# Patient Record
Sex: MALE | URBAN - METROPOLITAN AREA
[De-identification: names, ages, dates, MRNs, and addresses within clinical notes are randomized per-mention and may not be internally consistent; named-entity substitution may affect disease eponyms.]

---

## 2017-07-19 ENCOUNTER — TRANSFERRED RECORDS (OUTPATIENT)
Dept: HEALTH INFORMATION MANAGEMENT | Facility: CLINIC | Age: 22
End: 2017-07-19

## 2018-03-02 ENCOUNTER — TRANSFERRED RECORDS (OUTPATIENT)
Dept: HEALTH INFORMATION MANAGEMENT | Facility: CLINIC | Age: 23
End: 2018-03-02

## 2018-03-02 ENCOUNTER — TRANSFERRED RECORDS (OUTPATIENT)
Dept: FAMILY MEDICINE | Facility: CLINIC | Age: 23
End: 2018-03-02

## 2018-03-02 NOTE — PROGRESS NOTES
Records received from [facility], [place], operated by [facility].  Placed in MD jasmin Bruner for review. Appointment 3/5/18  Abril Ho TC

## 2018-03-06 ENCOUNTER — OFFICE VISIT (OUTPATIENT)
Dept: FAMILY MEDICINE | Facility: CLINIC | Age: 23
End: 2018-03-06
Payer: COMMERCIAL

## 2018-03-06 VITALS
HEIGHT: 74 IN | SYSTOLIC BLOOD PRESSURE: 166 MMHG | DIASTOLIC BLOOD PRESSURE: 80 MMHG | WEIGHT: 315 LBS | HEART RATE: 64 BPM | BODY MASS INDEX: 40.43 KG/M2 | TEMPERATURE: 98 F

## 2018-03-06 DIAGNOSIS — F90.0 ATTENTION DEFICIT HYPERACTIVITY DISORDER (ADHD), PREDOMINANTLY INATTENTIVE TYPE: Primary | ICD-10-CM

## 2018-03-06 DIAGNOSIS — R03.0 ELEVATED BLOOD PRESSURE READING WITHOUT DIAGNOSIS OF HYPERTENSION: ICD-10-CM

## 2018-03-06 DIAGNOSIS — F41.8 ANXIOUS DEPRESSION: ICD-10-CM

## 2018-03-06 PROCEDURE — 99203 OFFICE O/P NEW LOW 30 MIN: CPT | Performed by: FAMILY MEDICINE

## 2018-03-06 RX ORDER — DEXTROAMPHETAMINE SACCHARATE, AMPHETAMINE ASPARTATE MONOHYDRATE, DEXTROAMPHETAMINE SULFATE AND AMPHETAMINE SULFATE 5; 5; 5; 5 MG/1; MG/1; MG/1; MG/1
20 CAPSULE, EXTENDED RELEASE ORAL DAILY
Qty: 30 CAPSULE | Refills: 0 | Status: SHIPPED | OUTPATIENT
Start: 2018-03-06 | End: 2018-04-10

## 2018-03-06 RX ORDER — DEXTROAMPHETAMINE SACCHARATE, AMPHETAMINE ASPARTATE, DEXTROAMPHETAMINE SULFATE AND AMPHETAMINE SULFATE 2.5; 2.5; 2.5; 2.5 MG/1; MG/1; MG/1; MG/1
10 TABLET ORAL DAILY
Qty: 30 TABLET | Refills: 0 | Status: SHIPPED | OUTPATIENT
Start: 2018-03-06 | End: 2018-04-10

## 2018-03-06 RX ORDER — BUPROPION HYDROCHLORIDE 150 MG/1
150 TABLET ORAL DAILY
Qty: 30 TABLET | Refills: 1 | Status: SHIPPED | OUTPATIENT
Start: 2018-03-06 | End: 2018-05-09

## 2018-03-06 RX ORDER — BUPROPION HYDROCHLORIDE 150 MG/1
450 TABLET ORAL DAILY
Qty: 30 TABLET | Refills: 1 | Status: SHIPPED | OUTPATIENT
Start: 2018-03-06 | End: 2018-03-06

## 2018-03-06 NOTE — PROGRESS NOTES
SUBJECTIVE:   Barrett Olivera is a 22 year old male who presents to clinic today for the following health issues:      New Patient/Transfer of Care  Medication Followup of Adderall - has been out of meds 1+ weeks        He has been diagnosed with ADD a few years ago.  Has been on Adderall it did help with his concentration.  Sometime he thought maybe it was too much for him and it would affect his sleep.  He was also on trazodone to help with the sleep at some point.  He thinks Adderall did help with his concentration and he would like to continue    Depression and Anxiety Follow-Up    Status since last visit: Worsened,  because he has not been taking medication for a while. he has been on Wellbutrin and Lexapro previously.  He did not like  Lexapro because of the weight gain but it worked well.  He think Wellbutrin work fine although he has not taken anything in a long time.  He is willing to go back on some medication.   Although since he has been out of medication for a while he think his sleep is much better although does admit some stress anxiety.    Other associated symptoms:See phq-9 and poli 7    Complicating factors: Recent work/school stress.  Also had possible family history of anxiety    Significant life event: No, history of previous physician's too far for him to go back wanted to come here for his ongoing care    Current substance abuse: None    No flowsheet data found.  No flowsheet data found.    PHQ-9  English  PHQ-9   Any Language  POLI-7  Suicide Assessment Five-step Evaluation and Treatment (SAFE-T)    Problem list and histories reviewed & adjusted, as indicated.  Additional history: as documented    Patient Active Problem List   Diagnosis     Attention deficit hyperactivity disorder (ADHD), predominantly inattentive type     Anxious depression     Elevated blood pressure reading without diagnosis of hypertension     Past Surgical History:   Procedure Laterality Date     KNEE SURGERY      X2 ON RT,  "ONE ON LEFT, INJURY RELATED ,        Social History   Substance Use Topics     Smoking status: Never Smoker     Smokeless tobacco: Never Used     Alcohol use Yes     Family History   Problem Relation Age of Onset     Breast Cancer Mother      Breast Cancer Maternal Grandmother      DIABETES No family hx of      Coronary Artery Disease No family hx of      Hypertension No family hx of      Hyperlipidemia No family hx of      Prostate Cancer No family hx of      Colon Cancer No family hx of            Reviewed and updated as needed this visit by clinical staff  Tobacco  Allergies  Meds  Fam Hx  Soc Hx      Reviewed and updated as needed this visit by Provider         ROS:  Constitutional, HEENT, cardiovascular, pulmonary, GI, , musculoskeletal, neuro, skin, endocrine and psych systems are negative, except as otherwise noted.    OBJECTIVE:     /80  Pulse 64  Temp 98  F (36.7  C) (Tympanic)  Ht 1.875 m (6' 1.82\")  Wt (!) 147.9 kg (326 lb)  BMI 42.06 kg/m2  Body mass index is 42.06 kg/(m^2).  GENERAL: healthy, alert and no distress  NECK: no adenopathy, no asymmetry, masses, or scars and thyroid normal to palpation  RESP: lungs clear to auscultation - no rales, rhonchi or wheezes  CV: regular rate and rhythm, normal S1 S2, no S3 or S4, no murmur,   ABDOMEN: soft, nontender, no hepatosplenomegaly, no masses and bowel sounds normal  NEURO: Normal strength and tone, mentation intact and speech normal  PSYCH: mentation appears normal, affect normal/slightly uptight, speech pressured, judgement and insight intact and appearance well groomed        ASSESSMENT/PLAN:         (F90.0) Attention deficit hyperactivity disorder (ADHD), predominantly inattentive type  (primary encounter diagnosis)  Comment:   Plan: amphetamine-dextroamphetamine (ADDERALL) 10 MG         per tablet, amphetamine-dextroamphetamine         (ADDERALL XR) 20 MG per 24 hr capsule            (F41.8) Anxious depression  Comment:   Plan: " buPROPion (WELLBUTRIN XL) 150 MG 24 hr tablet,         DISCONTINUED: buPROPion (WELLBUTRIN XL) 150 MG         24 hr tablet          Discussed cares, talked about signs and symptoms of anxiety/ depression and treatment options. Willing to try Wellbutrin again.  Also gave refill on Adderall.  Pros/ cons of med's discussed . encouraged to see  to help and referral given . spent sometimes counseling patient. Follow up in 4-6 weeks, sooner if problem.       (R03.0) Elevated blood pressure reading without diagnosis of hypertension  Comment:   Plan: BP not  in adequate control. Discussed cares, low fat low salt diet etc. encouraged home BP monitoring/nurse visit. Follow up recheck in few weeks remains elevated, sooner if problem.       Patient expressed understanding and agreement with treatment plan. All patient's questions were answered, will let me know if has more later.  Medications: Rx's: Reviewed the potential side effects/complications of medications prescribed.       Lorena Davis MD  [facility]

## 2018-03-06 NOTE — LETTER
[facility]    03/06/18    Patient: Barrett Olivera  YOB: 1995  Medical Record Number: [id number]                                                                  Controlled Substance Agreement  I understand that my care provider has prescribed controlled substances (narcotics, tranquilizers, and/or stimulants) to help manage my condition(s).  I am taking this medicine to help me function or work.  I know that this is strong medicine.  It could have serious side effects and even cause a dependency on the drug.  If I stop these medicines suddenly, I could have severe withdrawal symptoms.    The risks, benefits, and side effects of these medication(s) were explained to me.  I agree that:  1. I will take part in other treatments as advised by my provider.  This may be psychiatry or counseling, physical therapy, behavioral therapy, group treatment, or a referral to a pain clinic.  I will reduce or stop my medicine when my provider tells me to do so.   2. I will take my medicines as prescribed.  I will not change the dose or schedule unless my provider tells me to.  There will be no refills if I  run out early.   I may be contacted at any time without warning and asked to complete a drug test or pill count.   3. I will keep all my appointments at the clinic.  If I miss appointments or fail to follow instructions, my provider may stop my medicine.  4. I will not ask other providers to prescribe controlled substances. And I will not accept controlled substances from other people. If I need another prescribed controlled substance for a new reason, I will notify my provider within one business day.  5. If I enroll in the [place] Medical Marijuana program, I will tell my provider.  I will also sign an agreement to share my medical records with my provider.  6. I will use one pharmacy to fill all of my controlled substance prescriptions.  If my prescription is mailed to my pharmacy, it may take 5  to 7 days for my medicine to be ready.  7. I understand that my provider, clinic care team, and pharmacy can track controlled substance prescriptions from other providers through a central database (prescription monitoring program).  8. I will bring in my list of medications (or my medicine bottles) each time I come to the clinic.  REV- 04/2016                                                                                                                                            Page 1 of 2      [facility]    03/06/18    Patient: Barrett Olivera  YOB: 1995  Medical Record Number: [id number]    9. Refills of controlled substances will be made only during office hours.  It is up to me to make sure that I do not run out of my medicines on weekends or holidays.    10. I am responsible for my prescriptions.  If the medicine is lost or stolen, it will not be replaced.   I also agree not to share these medicines with anyone.  11. I agree to not use ANY illegal or recreational drugs.  This includes marijuana, cocaine, bath salts or other drugs.  I agree not to use alcohol unless my provider says I may.  I agree to give urine samples whenever asked.  If I fail to give a urine sample, the provider may stop my medicine.     12. I will tell my nurse or provider right away if I become pregnant or have a new medical problem treated outside of [facility].  13. I understand that this medicine can affect my thinking and judgment.  It may be unsafe for me to drive, use machinery and do dangerous tasks.  I will not do any of these things until I know how the medicine affects me.  If my dose changes, I will wait to see how it affects me.  I will contact my provider if I have concerns about medicine side effects.  I understand that if I do not follow any of the conditions above, my prescriptions or treatment may be stopped.    I agree that my provider, clinic care team, and pharmacy may work with any  city, state or federal law enforcement agency that investigates the misuse, sale, or other diversion of my controlled medicine. I will allow my provider to discuss my care with or share a copy of this agreement with any other treating provider, pharmacy or emergency room where I receive care.  I agree to give up (waive) any right of privacy or confidentiality with respect to these authorizations.   I have read this agreement and have asked questions about anything I did not understand.   ___________________________________    ___________________________  Patient Signature                                                           Date and Time  ___________________________________     ____________________________  Witness                                                                            Date and Time  ___________________________________  Lorena Davis MD  REV-  04/2016                                                                                                                                                                 Page 2 of 2

## 2018-03-06 NOTE — MR AVS SNAPSHOT
"                  MRN:[id number]                      After Visit Summary   3/6/2018    Barrett Olivera    MRN: [id number]           Visit Information        Provider Department      3/6/2018 4:00 PM Lorena Davis MD [facility]        MyChart Information     MyChart lets you send messages to your doctor, view your test results, renew your prescriptions, schedule appointments and more. To sign up, go to [email or web address] . Click on \"Log in\" on the left side of the screen, which will take you to the Welcome page. Then click on \"Sign up Now\" on the right side of the page.     You will be asked to enter the access code listed below, as well as some personal information. Please follow the directions to create your username and password.     Your access code is: [email or web address]  Expires: 6/3/2018  1:54 PM     Your access code will  in 90 days. If you need help or a new code, please call your [facility] clinic or [phone].        Care EveryWhere ID     This is your Care EveryWhere ID. This could be used by other organizations to access your [facility] medical records  [id number]        Equal Access to Services     OSMEL MCBRIDE AH: Sandor Bhatti, waabiodunda kamari, qaybta kaalmada aleksandra, neha bishop. So [facility] [phone].    ATENCIÓN: Si habla español, tiene a pierson disposición servicios gratuitos de asistencia lingüística. Llame al [phone].    We comply with applicable federal civil rights laws and [place] laws. We do not discriminate on the basis of race, color, national origin, age, disability, sex, sexual orientation, or gender identity.            "

## 2018-03-06 NOTE — NURSING NOTE
"Chief Complaint   Patient presents with     Establish Care       Initial /80  Pulse 64  Temp 98  F (36.7  C) (Tympanic)  Ht 6' 1.82\" (1.875 m)  Wt (!) 326 lb (147.9 kg)  BMI 42.06 kg/m2 Estimated body mass index is 42.06 kg/(m^2) as calculated from the following:    Height as of this encounter: 6' 1.82\" (1.875 m).    Weight as of this encounter: 326 lb (147.9 kg).  Medication Reconciliation: complete    Current Outpatient Prescriptions   Medication Sig Dispense Refill     amphetamine-dextroamphetamine (ADDERALL) 10 MG per tablet Take 10 mg by mouth daily       amphetamine-dextroamphetamine (ADDERALL XR) 30 MG per 24 hr capsule Take 30 mg by mouth daily       escitalopram (LEXAPRO) 20 MG tablet Take 20 mg by mouth daily       traZODone (DESYREL) 50 MG tablet Take 50 mg by mouth At Bedtime       buPROPion (WELLBUTRIN XL) 150 MG 24 hr tablet Take 450 mg by mouth daily         Ye REYES CMA  "

## 2018-03-07 ENCOUNTER — TELEPHONE (OUTPATIENT)
Dept: FAMILY MEDICINE | Facility: CLINIC | Age: 23
End: 2018-03-07

## 2018-03-07 ASSESSMENT — ANXIETY QUESTIONNAIRES
GAD7 TOTAL SCORE: 15
6. BECOMING EASILY ANNOYED OR IRRITABLE: MORE THAN HALF THE DAYS
IF YOU CHECKED OFF ANY PROBLEMS ON THIS QUESTIONNAIRE, HOW DIFFICULT HAVE THESE PROBLEMS MADE IT FOR YOU TO DO YOUR WORK, TAKE CARE OF THINGS AT HOME, OR GET ALONG WITH OTHER PEOPLE: VERY DIFFICULT
7. FEELING AFRAID AS IF SOMETHING AWFUL MIGHT HAPPEN: SEVERAL DAYS
2. NOT BEING ABLE TO STOP OR CONTROL WORRYING: MORE THAN HALF THE DAYS
3. WORRYING TOO MUCH ABOUT DIFFERENT THINGS: MORE THAN HALF THE DAYS
5. BEING SO RESTLESS THAT IT IS HARD TO SIT STILL: MORE THAN HALF THE DAYS
1. FEELING NERVOUS, ANXIOUS, OR ON EDGE: NEARLY EVERY DAY

## 2018-03-07 ASSESSMENT — PATIENT HEALTH QUESTIONNAIRE - PHQ9: 5. POOR APPETITE OR OVEREATING: NEARLY EVERY DAY

## 2018-03-07 NOTE — TELEPHONE ENCOUNTER
Unable to leave a voice mail. Need a nurse only appointment for blood pressure recheck.     Hallie Gandhi

## 2018-03-08 ASSESSMENT — PATIENT HEALTH QUESTIONNAIRE - PHQ9: SUM OF ALL RESPONSES TO PHQ QUESTIONS 1-9: 12

## 2018-03-08 ASSESSMENT — ANXIETY QUESTIONNAIRES: GAD7 TOTAL SCORE: 15

## 2018-03-08 NOTE — TELEPHONE ENCOUNTER
TC called and left voicemail message  NTBS:nurse only appointment for blood pressure recheck    Abril CARREON

## 2018-03-15 ENCOUNTER — ALLIED HEALTH/NURSE VISIT (OUTPATIENT)
Dept: NURSING | Facility: CLINIC | Age: 23
End: 2018-03-15
Payer: COMMERCIAL

## 2018-03-15 VITALS — OXYGEN SATURATION: 100 % | SYSTOLIC BLOOD PRESSURE: 161 MMHG | HEART RATE: 95 BPM | DIASTOLIC BLOOD PRESSURE: 76 MMHG

## 2018-03-15 DIAGNOSIS — Z01.30 BP CHECK: Primary | ICD-10-CM

## 2018-03-15 PROCEDURE — 99207 ZZC NO CHARGE NURSE ONLY: CPT

## 2018-03-15 NOTE — PROGRESS NOTES
Barrett Olivera is a 22 year old male who comes in today for a Blood Pressure check because of ongoing blood pressure monitoring.    *Document pulse and BP  *Use new set of vitals button for multiple readings.  *Use extended vitals for orthostatic    Vitals as recorded, a large cuff was used. /76  Pulse 95  SpO2 100%      Patient is not on blood pressure medication     Patient is not monitoring Blood Pressure at home.  Average readings if yes are     Current complaints: none    Disposition: results routed to MD/AP     Barrett Olivera was in clinic today for bp nurse check at which time his blood pressure was noted to be elevated.  He  has been advised to follow up with his primary provider. We are also routing this message to his primary provider as an FYI.

## 2018-03-15 NOTE — MR AVS SNAPSHOT
"              After Visit Summary   3/15/2018    Barrett Olivera    MRN: [id number]           Patient Information     Date Of Birth          1995        Visit Information        Provider Department      3/15/2018 3:15 PM EC MA/LPN [facility]        Today's Diagnoses     BP check    -  1       Follow-ups after your visit        Your next 10 appointments already scheduled     Mar 15, 2018  3:15 PM CDT   Nurse Only with EC MA/LPN   [facility] ([facility])    [address] [place] [id number] [phone]              Who to contact     If you have questions or need follow up information about today's clinic visit or your schedule please contact [facility] directly at [phone].  Normal or non-critical lab and imaging results will be communicated to you by [employer], letter or phone within 4 business days after the clinic has received the results. If you do not hear from us within 7 days, please contact the clinic through [employer] or phone. If you have a critical or abnormal lab result, we will notify you by phone as soon as possible.  Submit refill requests through [employer] or call your pharmacy and they will forward the refill request to us. Please allow 3 business days for your refill to be completed.          Additional Information About Your Visit        MyChart Information     [employer] lets you send messages to your doctor, view your test results, renew your prescriptions, schedule appointments and more. To sign up, go to [email or web address] . Click on \"Log in\" on the left side of the screen, which will take you to the Welcome page. Then click on \"Sign up Now\" on the right side of the page.     You will be asked to enter the access code listed below, as well as some personal information. Please follow the directions to create your username and password.     Your access code is: [email or web address]  Expires: 6/3/2018  2:54 " PM     Your access code will  in 90 days. If you need help or a new code, please call your [facility] clinic or [phone].        Care EveryWhere ID     This is your Care EveryWhere ID. This could be used by other organizations to access your [facility] medical records  [id number]        Your Vitals Were     Pulse Pulse Oximetry                95 100%           Blood Pressure from Last 3 Encounters:   03/15/18 161/76   18 166/80    Weight from Last 3 Encounters:   18 (!) 326 lb (147.9 kg)              Today, you had the following     No orders found for display       Primary Care Provider Office Phone # Fax #    Brown MARIA ISABEL Rogel [phone]       [address] [place] [id number]        Equal Access to Services     OSMEL MCBRIDE : Hadii gerard greene hadasho Soomaali, waaxda luqadaha, qaybta kaalmada adeegyada, neha victoria . So [facility] [phone].    ATENCIÓN: Si habla español, tiene a pierson disposición servicios gratuitos de asistencia lingüística. Llame al [phone].    We comply with applicable federal civil rights laws and [place] laws. We do not discriminate on the basis of race, color, national origin, age, disability, sex, sexual orientation, or gender identity.            Thank you!     Thank you for choosing [facility]  for your care. Our goal is always to provide you with excellent care. Hearing back from our patients is one way we can continue to improve our services. Please take a few minutes to complete the written survey that you may receive in the mail after your visit with us. Thank you!             Your Updated Medication List - Protect others around you: Learn how to safely use, store and throw away your medicines at www.disposemymeds.org.          This list is accurate as of 3/15/18 12:34 PM.  Always use your most recent med list.                   Brand Name Dispense Instructions for use Diagnosis    *  amphetamine-dextroamphetamine 30 MG per 24 hr capsule    ADDERALL XR     Take 30 mg by mouth daily        * amphetamine-dextroamphetamine 10 MG per tablet    ADDERALL    30 tablet    Take 1 tablet (10 mg) by mouth daily    Attention deficit hyperactivity disorder (ADHD), predominantly inattentive type       * amphetamine-dextroamphetamine 20 MG per 24 hr capsule    ADDERALL XR    30 capsule    Take 1 capsule (20 mg) by mouth daily    Attention deficit hyperactivity disorder (ADHD), predominantly inattentive type       buPROPion 150 MG 24 hr tablet    WELLBUTRIN XL    30 tablet    Take 1 tablet (150 mg) by mouth daily    Anxious depression       LEXAPRO 20 MG tablet   Generic drug:  escitalopram      Take 20 mg by mouth daily        traZODone 50 MG tablet    DESYREL     Take 50 mg by mouth At Bedtime        * Notice:  This list has 3 medication(s) that are the same as other medications prescribed for you. Read the directions carefully, and ask your doctor or other care provider to review them with you.

## 2018-04-10 DIAGNOSIS — F90.0 ATTENTION DEFICIT HYPERACTIVITY DISORDER (ADHD), PREDOMINANTLY INATTENTIVE TYPE: ICD-10-CM

## 2018-04-10 DIAGNOSIS — F41.8 ANXIOUS DEPRESSION: ICD-10-CM

## 2018-04-10 NOTE — TELEPHONE ENCOUNTER
Adderall 10mg      Last Written Prescription Date:  3/6/18  Last Fill Quantity: 30,   # refills: 0  Last Office Visit: 3/6/18  Future Office visit:       Routing refill request to provider for review/approval because:  Drug not on the [facility], [facility] or [facility] refill protocol or controlled substance    Adderall XR 20mg      Last Written Prescription Date:  3/6/18  Last Fill Quantity: 30,   # refills: 0  Last Office Visit: 3/6/18  Future Office visit:       Routing refill request to provider for review/approval because:  Drug not on the [facility], [facility] or [facility] refill protocol or controlled substance    Tali Antunez CMA

## 2018-04-10 NOTE — LETTER
April 24, 2018      Barrett Olivera  [address] [place] [facility] [address]        Dear Barrett,     Our records indicates that it is time for you to be seen for an office visit with Brown Rogel MD.    Please call our office at [phone] to schedule an appointment for a Medication Check before your next refill .     Please disregard this notice if you have already made an appointment.    If you are no longer a [facility] patient; Please contact us and let us know that as well. You will also need to the let the pharmacy know the name of your current Provider so that they can send future request to them.       Sincerely,    [facility] Micaela Wilkin Staff

## 2018-04-11 NOTE — TELEPHONE ENCOUNTER
Pt hasn't been seen by me, established care with Dr Davis. Dr Davis gave him the prescription.  Reroute please

## 2018-04-13 RX ORDER — DEXTROAMPHETAMINE SACCHARATE, AMPHETAMINE ASPARTATE MONOHYDRATE, DEXTROAMPHETAMINE SULFATE AND AMPHETAMINE SULFATE 5; 5; 5; 5 MG/1; MG/1; MG/1; MG/1
20 CAPSULE, EXTENDED RELEASE ORAL DAILY
Qty: 30 CAPSULE | Refills: 0 | Status: SHIPPED | OUTPATIENT
Start: 2018-04-13 | End: 2018-07-26

## 2018-04-13 RX ORDER — DEXTROAMPHETAMINE SACCHARATE, AMPHETAMINE ASPARTATE, DEXTROAMPHETAMINE SULFATE AND AMPHETAMINE SULFATE 2.5; 2.5; 2.5; 2.5 MG/1; MG/1; MG/1; MG/1
10 TABLET ORAL DAILY
Qty: 30 TABLET | Refills: 0 | Status: SHIPPED | OUTPATIENT
Start: 2018-04-13 | End: 2018-07-26

## 2018-04-18 NOTE — TELEPHONE ENCOUNTER
left voicemail message for patient to contact Main Clinic Number to schedule.  NTBS: follow up for med check   Abril CARREON

## 2018-05-02 NOTE — TELEPHONE ENCOUNTER
Med and pharmacy pended, routing to Dr. Davis to advise.     Requested Prescriptions   Pending Prescriptions Disp Refills     buPROPion (WELLBUTRIN XL) 150 MG 24 hr tablet 30 tablet 1     Sig: Take 1 tablet (150 mg) by mouth daily    There is no refill protocol information for this order         PHQ-9 SCORE 3/7/2018   Total Score 12     Lina Savage RN   [facility] - Triage

## 2018-05-02 NOTE — TELEPHONE ENCOUNTER
Pt called back after receiving a letter. Pt states he has not been taking the adderall but is taking the bupropion and that is what the med check is for (see 3/6 OV notes). He is not able to come in for the next 2 weeks due to finals, but will run out of the bupropion in a week. He feels the bupropion is working well. He is requesting a refill to cover him until he can come in to see Dr Davis. Pt can be reached at  [phone] and uses [facility] pharmacy. Thank you.  Reina Miramontes,

## 2018-05-07 RX ORDER — BUPROPION HYDROCHLORIDE 150 MG/1
150 TABLET ORAL DAILY
Qty: 30 TABLET | Refills: 1 | OUTPATIENT
Start: 2018-05-07

## 2018-05-15 ENCOUNTER — OFFICE VISIT (OUTPATIENT)
Dept: FAMILY MEDICINE | Facility: CLINIC | Age: 23
End: 2018-05-15
Payer: COMMERCIAL

## 2018-05-15 VITALS
BODY MASS INDEX: 40.17 KG/M2 | SYSTOLIC BLOOD PRESSURE: 132 MMHG | DIASTOLIC BLOOD PRESSURE: 82 MMHG | OXYGEN SATURATION: 100 % | HEART RATE: 68 BPM | WEIGHT: 313 LBS | HEIGHT: 74 IN | TEMPERATURE: 98.4 F

## 2018-05-15 DIAGNOSIS — F90.0 ATTENTION DEFICIT HYPERACTIVITY DISORDER (ADHD), PREDOMINANTLY INATTENTIVE TYPE: ICD-10-CM

## 2018-05-15 DIAGNOSIS — Z13.6 CARDIOVASCULAR SCREENING; LDL GOAL LESS THAN 160: ICD-10-CM

## 2018-05-15 DIAGNOSIS — Z00.00 ROUTINE HISTORY AND PHYSICAL EXAMINATION OF ADULT: Primary | ICD-10-CM

## 2018-05-15 DIAGNOSIS — F41.0 PANIC ATTACK: ICD-10-CM

## 2018-05-15 DIAGNOSIS — R03.0 ELEVATED BLOOD PRESSURE READING WITHOUT DIAGNOSIS OF HYPERTENSION: ICD-10-CM

## 2018-05-15 DIAGNOSIS — R53.83 OTHER FATIGUE: ICD-10-CM

## 2018-05-15 DIAGNOSIS — E66.01 OBESITY, MORBID, BMI 40.0-49.9 (H): ICD-10-CM

## 2018-05-15 DIAGNOSIS — F41.8 ANXIOUS DEPRESSION: ICD-10-CM

## 2018-05-15 PROCEDURE — 99385 PREV VISIT NEW AGE 18-39: CPT | Performed by: FAMILY MEDICINE

## 2018-05-15 PROCEDURE — 99214 OFFICE O/P EST MOD 30 MIN: CPT | Mod: 25 | Performed by: FAMILY MEDICINE

## 2018-05-15 RX ORDER — ALPRAZOLAM 0.25 MG
0.25 TABLET ORAL 3 TIMES DAILY PRN
Qty: 14 TABLET | Refills: 0 | Status: SHIPPED | OUTPATIENT
Start: 2018-05-15 | End: 2018-09-13

## 2018-05-15 RX ORDER — SERTRALINE HYDROCHLORIDE 25 MG/1
25 TABLET, FILM COATED ORAL DAILY
Qty: 30 TABLET | Refills: 0 | Status: SHIPPED | OUTPATIENT
Start: 2018-05-15 | End: 2018-06-13

## 2018-05-15 ASSESSMENT — ANXIETY QUESTIONNAIRES
GAD7 TOTAL SCORE: 17
5. BEING SO RESTLESS THAT IT IS HARD TO SIT STILL: MORE THAN HALF THE DAYS
1. FEELING NERVOUS, ANXIOUS, OR ON EDGE: NEARLY EVERY DAY
2. NOT BEING ABLE TO STOP OR CONTROL WORRYING: MORE THAN HALF THE DAYS
3. WORRYING TOO MUCH ABOUT DIFFERENT THINGS: MORE THAN HALF THE DAYS
7. FEELING AFRAID AS IF SOMETHING AWFUL MIGHT HAPPEN: MORE THAN HALF THE DAYS
IF YOU CHECKED OFF ANY PROBLEMS ON THIS QUESTIONNAIRE, HOW DIFFICULT HAVE THESE PROBLEMS MADE IT FOR YOU TO DO YOUR WORK, TAKE CARE OF THINGS AT HOME, OR GET ALONG WITH OTHER PEOPLE: VERY DIFFICULT
6. BECOMING EASILY ANNOYED OR IRRITABLE: NEARLY EVERY DAY

## 2018-05-15 ASSESSMENT — PATIENT HEALTH QUESTIONNAIRE - PHQ9: 5. POOR APPETITE OR OVEREATING: NEARLY EVERY DAY

## 2018-05-15 NOTE — LETTER
My Depression Action Plan  Name: Barrett Olivera   Date of Birth 1995  Date: 5/15/2018    My doctor: Lorena Davis   My clinic: [address] [address]  [phone]          GREEN    ZONE   Good Control    What it looks like:     Things are going generally well. You have normal up s and down s. You may even feel depressed from time to time, but bad moods usually last less than a day.   What you need to do:  1. Continue to care for yourself (see self care plan)  2. Check your depression survival kit and update it as needed  3. Follow your physician s recommendations including any medication.  4. Do not stop taking medication unless you consult with your physician first.           YELLOW         ZONE Getting Worse    What it looks like:     Depression is starting to interfere with your life.     It may be hard to get out of bed; you may be starting to isolate yourself from others.    Symptoms of depression are starting to last most all day and this has happened for several days.     You may have suicidal thoughts but they are not constant.   What you need to do:     1. Call your care team, your response to treatment will improve if you keep your care team informed of your progress. Yellow periods are signs an adjustment may need to be made.     2. Continue your self-care, even if you have to fake it!    3. Talk to someone in your support network    4. Open up your depression survival kit           RED    ZONE Medical Alert - Get Help    What it looks like:     Depression is seriously interfering with your life.     You may experience these or other symptoms: You can t get out of bed most days, can t work or engage in other necessary activities, you have trouble taking care of basic hygiene, or basic responsibilities, thoughts of suicide or death that will not go away, self-injurious behavior.     What you need to do:  1. Call your care  team and request a same-day appointment. If they are not available (weekends or after hours) call your local crisis line, emergency room or 911.            Depression Self Care Plan / Survival Kit    Self-Care for Depression  Here s the deal. Your body and mind are really not as separate as most people think.  What you do and think affects how you feel and how you feel influences what you do and think. This means if you do things that people who feel good do, it will help you feel better.  Sometimes this is all it takes.  There is also a place for medication and therapy depending on how severe your depression is, so be sure to consult with your medical provider and/ or Behavioral Health Consultant if your symptoms are worsening or not improving.     In order to better manage my stress, I will:    Exercise  Get some form of exercise, every day. This will help reduce pain and release endorphins, the  feel good  chemicals in your brain. This is almost as good as taking antidepressants!  This is not the same as joining a gym and then never going! (they count on that by the way ) It can be as simple as just going for a walk or doing some gardening, anything that will get you moving.      Hygiene   Maintain good hygiene (Get out of bed in the morning, Make your bed, Brush your teeth, Take a shower, and Get dressed like you were going to work, even if you are unemployed).  If your clothes don't fit try to get ones that do.    Diet  I will strive to eat foods that are good for me, drink plenty of water, and avoid excessive sugar, caffeine, alcohol, and other mood-altering substances.  Some foods that are helpful in depression are: complex carbohydrates, B vitamins, flaxseed, fish or fish oil, fresh fruits and vegetables.    Psychotherapy  I agree to participate in Individual Therapy (if recommended).    Medication  If prescribed medications, I agree to take them.  Missing doses can result in serious side effects.  I  understand that drinking alcohol, or other illicit drug use, may cause potential side effects.  I will not stop my medication abruptly without first discussing it with my provider.    Staying Connected With Others  I will stay in touch with my friends, family members, and my primary care provider/team.    Use your imagination  Be creative.  We all have a creative side; it doesn t matter if it s oil painting, sand castles, or mud pies! This will also kick up the endorphins.    Witness Beauty  (AKA stop and smell the roses) Take a look outside, even in mid-winter. Notice colors, textures. Watch the squirrels and birds.     Service to others  Be of service to others.  There is always someone else in need.  By helping others we can  get out of ourselves  and remember the really important things.  This also provides opportunities for practicing all the other parts of the program.    Humor  Laugh and be silly!  Adjust your TV habits for less news and crime-drama and more comedy.    Control your stress  Try breathing deep, massage therapy, biofeedback, and meditation. Find time to relax each day.     My support system    Clinic Contact:  Phone number:    Contact 1:  Phone number:    Contact 2:  Phone number:    Congregation/:  Phone number:    Therapist:  Phone number:    Local crisis center:    Phone number:    Other community support:  Phone number:

## 2018-05-15 NOTE — PROGRESS NOTES
SUBJECTIVE:   CC: Barrett Olivera is an 23 year old male who presents for preventative health visit.     Healthy Habits:    Do you get at least three servings of calcium containing foods daily (dairy, green leafy vegetables, etc.)? yes    Amount of exercise or daily activities, outside of work: 5 day(s) per week    Problems taking medications regularly No    Medication side effects: No    Have you had an eye exam in the past two years? yes    Do you see a dentist twice per year? yes    Do you have sleep apnea, excessive snoring or daytime drowsiness?no       PROBLEMS TO ADD ON...  Has been tired , worries about weight gain and had elevated BP few times, so just worried , wish to get labs. Aunt also melton thyroid issue so concerned.   Over all he is feeling a little bit better since stating Wellbutrin, it also has helped with energy level a bit.      ADD, Depression and Anxiety Follow-Up    Status since last visit: Improved more energetic or s depressed but still has lot of anxiety, had panic attack few times. no problem taking Wellbutrin .     Does not like  taking adderal bc it made hi more anxious,so wants to stay off for a while.     lexapro helped with anxiety but had  weight gain so he stopped it . He is trying to eat better and has lost some weight. Wiling to try different med's to help with anxiety    Other associated symptoms:,See phq-9 and poli 7        Complicating factors: stress  At work, school etc  , also some family issue , strained relationship with mom etc     Significant life event: No     Current substance abuse: Alcohol, 3-4/mostly weekend     PHQ-9 3/7/2018 5/15/2018   Total Score 12 9   Q9: Suicide Ideation Not at all Not at all     POLI-7 SCORE 3/7/2018 5/15/2018   Total Score 15 17       PHQ-9  English  PHQ-9   Any Language  POLI-7  Suicide Assessment Five-step Evaluation and Treatment (SAFE-T)    Today's PHQ-2 Score:   PHQ-2 ( 1999 [employer]) 5/15/2018 3/7/2018   Q1: Little interest or pleasure in  doing things - 1   Q2: Feeling down, depressed or hopeless 0 1   PHQ-2 Score - 2       Abuse: Current or Past(Physical, Sexual or Emotional)- No  Do you feel safe in your environment - Yes    Social History   Substance Use Topics     Smoking status: Never Smoker     Smokeless tobacco: Never Used     Alcohol use Yes      If you drink alcohol do you typically have >3 drinks per day or >7 drinks per week? No                      Last PSA: No results found for: PSA    Reviewed orders with patient. Reviewed health maintenance and updated orders accordingly - Yes  BP Readings from Last 3 Encounters:   05/15/18 132/82   03/15/18 161/76   03/06/18 166/80    Wt Readings from Last 3 Encounters:   05/15/18 313 lb (142 kg)   03/06/18 (!) 326 lb (147.9 kg)                    Reviewed and updated as needed this visit by clinical staff  Tobacco  Allergies  Meds  Justin Francois         Reviewed and updated as needed this visit by Provider  Justin Francois        Past Medical History:   Diagnosis Date     ADD (attention deficit disorder)     DIAGNOSED 2013      Anxiety     DIAGNOSED AT AGE 21         ROS:  CONSTITUTIONAL: NEGATIVE for fever, chills, change in weight  INTEGUMENTARY/SKIN: NEGATIVE for worrisome rashes, moles or lesions  EYES: NEGATIVE for vision changes or irritation  ENT: NEGATIVE for ear, mouth and throat problems  RESP: NEGATIVE for significant cough or SOB  CV: NEGATIVE for chest pain, palpitations or peripheral edema  GI: NEGATIVE for nausea, abdominal pain, heartburn, or change in bowel habits   male: negative for dysuria, hematuria, decreased urinary stream, erectile dysfunction, urethral discharge  MUSCULOSKELETAL: NEGATIVE for significant arthralgias or myalgia  NEURO: NEGATIVE for weakness, dizziness or paresthesias  ENDOCRINE: NEGATIVE for temperature intolerance, skin/hair changes  HEME/ALLERGY/IMMUNE: NEGATIVE for bleeding problems  PSYCHIATRIC: NEGATIVE for changes in mood or affect    OBJECTIVE:   /82   "Pulse 68  Temp 98.4  F (36.9  C) (Tympanic)  Ht 6' 1.82\" (1.875 m)  Wt 313 lb (142 kg)  SpO2 100%  BMI 40.38 kg/m2  EXAM:  GENERAL: healthy, alert and no distress  EYES: Eyes grossly normal to inspection, PERRL and conjunctivae and sclerae normal  HENT: ear canals and TM's normal, nose and mouth without ulcers or lesions  NECK: no adenopathy, no asymmetry, masses, or scars and thyroid normal to palpation  RESP: lungs clear to auscultation - no rales, rhonchi or wheezes  CV: regular rate and rhythm, normal S1 S2, no S3 or S4, no murmur, click or rub, no peripheral edema and peripheral pulses strong  ABDOMEN: soft, nontender, no hepatosplenomegaly, no masses and bowel sounds normal   (male): normal male genitalia without lesions or urethral discharge, no hernia  MS: no gross musculoskeletal defects noted, no edema  SKIN: no suspicious lesions or rashes  NEURO: Normal strength and tone, mentation intact and speech normal  PSYCH: mentation appears normal, affect normal/bright    ASSESSMENT/PLAN:   (Z00.00) Routine history and physical examination of adult  (primary encounter diagnosis)  Comment:   Plan:     (F41.8) Anxious depression  Comment:   Plan: sertraline (ZOLOFT) 25 MG tablet, TSH with free        T4 reflex, MENTAL HEALTH REFERRAL  - Adult;         Outpatient Treatment;         Individual/Couples/Family/Group Therapy/Health         Psychology; [facility]: [facility]         [phone]; We will contact you to schedule        the appointment or please call with any         questions            (F41.0) Panic attack  Comment:   Plan: ALPRAZolam (XANAX) 0.25 MG tablet, MENTAL         HEALTH REFERRAL  - Adult; Outpatient Treatment;        Individual/Couples/Family/Group Therapy/Health         Psychology; [facility]: [facility]         [phone]; We will contact you to schedule        the appointment or please call with any         questions              Discussed cares, talked about " signs and symptoms of anxiety/ depression and treatment options. Willing to add Zoloft to see if helps wit anxiety. Also gave few xanax to take as needed for panic attack. . Pros/ cons of med's discussed . encouraged to see  to help and referral given . spent sometimes counseling patient. Follow up in 3-4 weeks , sooner if problem.       (F90.0) Attention deficit hyperactivity disorder (ADHD), predominantly inattentive type  Comment: he wish to not take Adderal for now     Plan: MENTAL HEALTH REFERRAL  - Adult; Outpatient         Treatment; Individual/Couples/Family/Group         Therapy/Health Psychology; G: [facility] [phone]; We will         contact you to schedule the appointment or         please call with any questions            (R03.0) Elevated blood pressure reading without diagnosis of hypertension  Comment:   Plan: Lipid panel reflex to direct LDL Fasting,         Comprehensive metabolic panel        BP not  inadequate control, although second reading is better, but do need to watch. Likely stress related/ ? Obesity etc.  Discussed cares, low fat low salt  diet etc. Check labs, call pt with results.. encouraged home BP monitoring/ nurse visit . Follow up if problem.       (Z13.6) CARDIOVASCULAR SCREENING; LDL GOAL LESS THAN 160  Comment:   Plan: Lipid panel reflex to direct LDL Fasting            (R53.83) Other fatigue  Comment:   Plan: Comprehensive metabolic panel, TSH with free T4        reflex, OFFICE/OUTPT VISIT,EST,LEVL III, CBC         with platelets    (E66.01) Obesity, morbid, BMI 40.0-49.9 (H)  Comment:   Plan: TSH with free T4 reflex            Healthy diet and exercise reviewed.  Limit pop and juice intake.  Risks of obesity discussed.  Encourage exercise.      Check labs, he will return for fasting labs.Cares and  treatment discussed follow. up if problem   Patient expressed understanding and agreement with treatment plan. All patient's questions  "were answered, will let me know if has more later.  Medications: Rx's: Reviewed the potential side effects/complications of medications prescribed.       COUNSELING:  Reviewed preventive health counseling, as reflected in patient instructions       Regular exercise       Healthy diet/nutrition       Vision screening       reports that he has never smoked. He has never used smokeless tobacco.    Estimated body mass index is 40.38 kg/(m^2) as calculated from the following:    Height as of this encounter: 6' 1.82\" (1.875 m).    Weight as of this encounter: 313 lb (142 kg).   Weight management plan: Discussed healthy diet and exercise guidelines and patient will follow up in 12 months in clinic to re-evaluate.      HCM issues discussed. Diet/ exercise discussed. Check labs , call patiens with results. follow up as needed.       Counseling Resources:  ATP IV Guidelines  Pooled Cohorts Equation Calculator  FRAX Risk Assessment  ICSI Preventive Guidelines  Dietary Guidelines for Americans, 2010  USDA's MyPlate  ASA Prophylaxis  Lung CA Screening    Lorena Davis MD  [facility] [place]    "

## 2018-05-15 NOTE — MR AVS SNAPSHOT
After Visit Summary   5/15/2018    Barrett Olivera    MRN: [id number]           Patient Information     Date Of Birth          1995        Visit Information        Provider Department      5/15/2018 4:00 PM Lorena Davis MD [facility] [place]        Today's Diagnoses     Anxious depression    -  1    Attention deficit hyperactivity disorder (ADHD), predominantly inattentive type        Elevated blood pressure reading without diagnosis of hypertension        CARDIOVASCULAR SCREENING; LDL GOAL LESS THAN 160        BMI 40.0-44.9, adult (H)        Panic attack          Care Instructions      Preventive Health Recommendations  Male Ages 18 - 25     Yearly exam:             See your health care provider every year in order to  o   Review health changes.   o   Discuss preventive care.    o   Review your medicines if your doctor has prescribed any.    You should be tested each year for STDs (sexually transmitted diseases).     Talk to your provider about cholesterol testing.      If you are at risk for diabetes, you should have a diabetes test (fasting glucose).    Shots: Get a flu shot each year. Get a tetanus shot every 10 years.     Nutrition:    Eat at least 5 servings of fruits and vegetables daily.     Eat whole-grain bread, whole-wheat pasta and brown rice instead of white grains and rice.     Talk to your provider about calcium and Vitamin D.     Lifestyle    Exercise for at least 150 minutes a week (30 minutes a day, 5 days a week). This will help you control your weight and prevent disease.     Limit alcohol to one drink per day.     No smoking.     Wear sunscreen to prevent skin cancer.     See your dentist every six months for an exam and cleaning.             Follow-ups after your visit        Additional Services     MENTAL HEALTH REFERRAL  - Adult; Outpatient Treatment; Individual/Couples/Family/Group Therapy/Health Psychology; G: [facility] [phone];  We will contact you to schedule the appointment or please call with any questions       All scheduling is subject to the client's specific insurance plan & benefits, provider/location availability, and provider clinical specialities.  Please arrive 15 minutes early for your first appointment and bring your completed paperwork.    Please be aware that coverage of these services is subject to the terms and limitations of your health insurance plan.  Call member services at your health plan with any benefit or coverage questions.                            Follow-up notes from your care team     Return in about 4 weeks (around 6/12/2018) for Lab Work, BP  check with nurse.      Your next 10 appointments already scheduled     May 16, 2018  9:15 AM CDT   LAB with [facility] LAB   [facility] [place] [facility]    [address] [place] [id number] [phone]           OUTSIDE LABS: Please include name of facility and Physician that is requesting outside labs be drawn.  Please indicate if labs are fasting or non-fasting on appt notes.  Be as specific as you can on which labs are being drawn.              Future tests that were ordered for you today     Open Future Orders        Priority Expected Expires Ordered    Lipid panel reflex to direct LDL Fasting Routine  8/15/2018 5/15/2018    Comprehensive metabolic panel Routine  8/15/2018 5/15/2018    TSH with free T4 reflex Routine  8/15/2018 5/15/2018            Who to contact     If you have questions or need follow up information about today's clinic visit or your schedule please contact [facility] directly at [phone].  Normal or non-critical lab and imaging results will be communicated to you by MyChart, letter or phone within 4 business days after the clinic has received the results. If you do not hear from us within 7 days, please contact the clinic through MyChart or phone. If you have a critical or  "abnormal lab result, we will notify you by phone as soon as possible.  Submit refill requests through [employer] or call your pharmacy and they will forward the refill request to us. Please allow 3 business days for your refill to be completed.          Additional Information About Your Visit        [employer] Information     Mango lets you send messages to your doctor, view your test results, renew your prescriptions, schedule appointments and more. To sign up, go to [email or web address] . Click on \"Log in\" on the left side of the screen, which will take you to the Welcome page. Then click on \"Sign up Now\" on the right side of the page.     You will be asked to enter the access code listed below, as well as some personal information. Please follow the directions to create your username and password.     Your access code is: [email or web address]  Expires: 6/3/2018  2:54 PM     Your access code will  in 90 days. If you need help or a new code, please call your [place] clinic or [phone].        Care EveryWhere ID     This is your Care EveryWhere ID. This could be used by other organizations to access your [place] medical records  [id number]        Your Vitals Were     Pulse Temperature Height Pulse Oximetry BMI (Body Mass Index)       68 98.4  F (36.9  C) (Tympanic) 6' 1.82\" (1.875 m) 100% 40.38 kg/m2        Blood Pressure from Last 3 Encounters:   05/15/18 132/82   03/15/18 161/76   18 166/80    Weight from Last 3 Encounters:   05/15/18 313 lb (142 kg)   18 (!) 326 lb (147.9 kg)              We Performed the Following     MENTAL HEALTH REFERRAL  - Adult; Outpatient Treatment; Individual/Couples/Family/Group Therapy/Health Psychology; [facility]: [facility] [phone]; We will contact you to schedule the appointment or please call with any questions          Today's Medication Changes          These changes are accurate as of 5/15/18  4:09 PM.  If you have any questions, ask your nurse " or doctor.               Start taking these medicines.        Dose/Directions    ALPRAZolam 0.25 MG tablet   Commonly known as:  XANAX   Used for:  Panic attack   Started by:  Lorena Davis MD        Dose:  0.25 mg   Take 1 tablet (0.25 mg) by mouth 3 times daily as needed for anxiety   Quantity:  14 tablet   Refills:  0       sertraline 25 MG tablet   Commonly known as:  ZOLOFT   Used for:  Anxious depression   Started by:  Lorena Davis MD        Dose:  25 mg   Take 1 tablet (25 mg) by mouth daily   Quantity:  30 tablet   Refills:  0         These medicines have changed or have updated prescriptions.        Dose/Directions    * amphetamine-dextroamphetamine 10 MG per tablet   Commonly known as:  ADDERALL   This may have changed:  Another medication with the same name was removed. Continue taking this medication, and follow the directions you see here.   Used for:  Attention deficit hyperactivity disorder (ADHD), predominantly inattentive type   Changed by:  Lorena Davis MD        Dose:  10 mg   Take 1 tablet (10 mg) by mouth daily   Quantity:  30 tablet   Refills:  0       * amphetamine-dextroamphetamine 20 MG per 24 hr capsule   Commonly known as:  ADDERALL XR   This may have changed:  Another medication with the same name was removed. Continue taking this medication, and follow the directions you see here.   Used for:  Attention deficit hyperactivity disorder (ADHD), predominantly inattentive type   Changed by:  Lorena Davis MD        Dose:  20 mg   Take 1 capsule (20 mg) by mouth daily   Quantity:  30 capsule   Refills:  0       * Notice:  This list has 2 medication(s) that are the same as other medications prescribed for you. Read the directions carefully, and ask your doctor or other care provider to review them with you.         Where to get your medicines      These medications were sent to [facility] [place] [facility] [place], [place] [id number]  [address], [place] [address]     Phone:  [phone]     sertraline 25 MG tablet         Some of these will need a paper prescription and others can be bought over the counter.  Ask your nurse if you have questions.     Bring a paper prescription for each of these medications     ALPRAZolam 0.25 MG tablet                Primary Care Provider Office Phone # Fax #    Lorena Demetrio Davis [phone]       [address]  [place] [address]        Equal Access to Services     [facility]: Hadii aad ku hadasho Soomaali, waaxda luqadaha, qaybta kaalmada adeegyada, waxay idiin hayaan adeshyanne khjohanne victoria . So [facility] [phone].    ATENCIÓN: Si habla español, tiene a pierson disposición servicios gratuitos de asistencia lingüística. Ll[facility] [phone].    We comply with applicable federal civil rights laws and [place] laws. We do not discriminate on the basis of race, color, national origin, age, disability, sex, sexual orientation, or gender identity.            Thank you!     [facility] your [facility]. Our goal is always to provide you with excellent care. Hearing back from our patients is one way we can continue to improve our services. Please take a few minutes to complete the written survey that you may receive in the mail after your visit with us. Thank you!             Your Updated Medication List - Protect others around you: Learn how to safely use, store and throw away your medicines at www.disposemymeds.org.          This list is accurate as of 5/15/18  4:09 PM.  Always use your most recent med list.                   Brand Name Dispense Instructions for use Diagnosis    ALPRAZolam 0.25 MG tablet    XANAX    14 tablet    Take 1 tablet (0.25 mg) by mouth 3 times daily as needed for anxiety    Panic attack       * amphetamine-dextroamphetamine 10 MG per tablet    ADDERALL    30 tablet    Take 1 tablet (10 mg) by mouth daily     Attention deficit hyperactivity disorder (ADHD), predominantly inattentive type       * amphetamine-dextroamphetamine 20 MG per 24 hr capsule    ADDERALL XR    30 capsule    Take 1 capsule (20 mg) by mouth daily    Attention deficit hyperactivity disorder (ADHD), predominantly inattentive type       buPROPion 150 MG 24 hr tablet    WELLBUTRIN XL    30 tablet    TAKE ONE TABLET BY MOUTH EVERY DAY    Anxious depression       sertraline 25 MG tablet    ZOLOFT    30 tablet    Take 1 tablet (25 mg) by mouth daily    Anxious depression       * Notice:  This list has 2 medication(s) that are the same as other medications prescribed for you. Read the directions carefully, and ask your doctor or other care provider to review them with you.

## 2018-05-16 DIAGNOSIS — R53.83 OTHER FATIGUE: ICD-10-CM

## 2018-05-16 DIAGNOSIS — Z00.00 ROUTINE HISTORY AND PHYSICAL EXAMINATION OF ADULT: ICD-10-CM

## 2018-05-16 DIAGNOSIS — F41.8 ANXIOUS DEPRESSION: ICD-10-CM

## 2018-05-16 DIAGNOSIS — Z13.6 CARDIOVASCULAR SCREENING; LDL GOAL LESS THAN 160: ICD-10-CM

## 2018-05-16 DIAGNOSIS — R03.0 ELEVATED BLOOD PRESSURE READING WITHOUT DIAGNOSIS OF HYPERTENSION: ICD-10-CM

## 2018-05-16 LAB
ERYTHROCYTE [DISTWIDTH] IN BLOOD BY AUTOMATED COUNT: 12.3 % (ref 10–15)
HCT VFR BLD AUTO: 45.6 % (ref 40–53)
HGB BLD-MCNC: 15.4 G/DL (ref 13.3–17.7)
MCH RBC QN AUTO: 29 PG (ref 26.5–33)
MCHC RBC AUTO-ENTMCNC: 33.8 G/DL (ref 31.5–36.5)
MCV RBC AUTO: 86 FL (ref 78–100)
PLATELET # BLD AUTO: 214 10E9/L (ref 150–450)
RBC # BLD AUTO: 5.31 10E12/L (ref 4.4–5.9)
WBC # BLD AUTO: 7.5 10E9/L (ref 4–11)

## 2018-05-16 PROCEDURE — 87389 HIV-1 AG W/HIV-1&-2 AB AG IA: CPT | Performed by: FAMILY MEDICINE

## 2018-05-16 PROCEDURE — 80061 LIPID PANEL: CPT | Performed by: FAMILY MEDICINE

## 2018-05-16 PROCEDURE — 84443 ASSAY THYROID STIM HORMONE: CPT | Performed by: FAMILY MEDICINE

## 2018-05-16 PROCEDURE — 36415 COLL VENOUS BLD VENIPUNCTURE: CPT | Performed by: FAMILY MEDICINE

## 2018-05-16 PROCEDURE — 80053 COMPREHEN METABOLIC PANEL: CPT | Performed by: FAMILY MEDICINE

## 2018-05-16 PROCEDURE — 85027 COMPLETE CBC AUTOMATED: CPT | Performed by: FAMILY MEDICINE

## 2018-05-16 ASSESSMENT — PATIENT HEALTH QUESTIONNAIRE - PHQ9: SUM OF ALL RESPONSES TO PHQ QUESTIONS 1-9: 9

## 2018-05-16 ASSESSMENT — ANXIETY QUESTIONNAIRES: GAD7 TOTAL SCORE: 17

## 2018-05-17 LAB
ALBUMIN SERPL-MCNC: 4.5 G/DL (ref 3.4–5)
ALP SERPL-CCNC: 77 U/L (ref 40–150)
ALT SERPL W P-5'-P-CCNC: 36 U/L (ref 0–70)
ANION GAP SERPL CALCULATED.3IONS-SCNC: 10 MMOL/L (ref 3–14)
AST SERPL W P-5'-P-CCNC: 23 U/L (ref 0–45)
BILIRUB SERPL-MCNC: 1.2 MG/DL (ref 0.2–1.3)
BUN SERPL-MCNC: 17 MG/DL (ref 7–30)
CALCIUM SERPL-MCNC: 9.3 MG/DL (ref 8.5–10.1)
CHLORIDE SERPL-SCNC: 107 MMOL/L (ref 94–109)
CHOLEST SERPL-MCNC: 150 MG/DL
CO2 SERPL-SCNC: 24 MMOL/L (ref 20–32)
CREAT SERPL-MCNC: 1.09 MG/DL (ref 0.66–1.25)
GFR SERPL CREATININE-BSD FRML MDRD: 82 ML/MIN/1.7M2
GLUCOSE SERPL-MCNC: 105 MG/DL (ref 70–99)
HDLC SERPL-MCNC: 43 MG/DL
HIV 1+2 AB+HIV1 P24 AG SERPL QL IA: NONREACTIVE
LDLC SERPL CALC-MCNC: 97 MG/DL
NONHDLC SERPL-MCNC: 107 MG/DL
POTASSIUM SERPL-SCNC: 4.3 MMOL/L (ref 3.4–5.3)
PROT SERPL-MCNC: 7.4 G/DL (ref 6.8–8.8)
SODIUM SERPL-SCNC: 141 MMOL/L (ref 133–144)
TRIGL SERPL-MCNC: 49 MG/DL
TSH SERPL DL<=0.005 MIU/L-ACNC: 1.6 MU/L (ref 0.4–4)

## 2018-07-23 ENCOUNTER — MYC REFILL (OUTPATIENT)
Dept: FAMILY MEDICINE | Facility: CLINIC | Age: 23
End: 2018-07-23

## 2018-07-23 DIAGNOSIS — F41.8 ANXIOUS DEPRESSION: ICD-10-CM

## 2018-07-23 NOTE — TELEPHONE ENCOUNTER
Message from [employer]:  Original authorizing provider: Lorena Davis MD    Barrett Olivera would like a refill of the following medications:  buPROPion (WELLBUTRIN XL) 150 MG 24 hr tablet [Lorena Davis MD]  sertraline (ZOLOFT) 25 MG tablet [Lorena Davis MD]    Preferred pharmacy: [facility] [place] [address] AT [facility]    Comment:

## 2018-07-24 NOTE — TELEPHONE ENCOUNTER
"Requested Prescriptions   Pending Prescriptions Disp Refills     buPROPion (WELLBUTRIN XL) 150 MG 24 hr tablet  Last Written Prescription Date:  6/14/18  Last Fill Quantity: 30,  # refills: 0   Last office visit: 5/15/2018 with prescribing provider:  Ryan   Future Office Visit:     30 tablet 0     Sig: Take 1 tablet (150 mg) by mouth daily    SSRIs Protocol Passed    7/23/2018  1:39 PM       Passed - Recent (12 mo) or future (30 days) visit within the authorizing provider's specialty    Patient had office visit in the last 12 months or has a visit in the next 30 days with authorizing provider or within the authorizing provider's specialty.  See \"Patient Info\" tab in inbasket, or \"Choose Columns\" in Meds & Orders section of the refill encounter.           Passed - Medication is Bupropion    If the medication is Bupropion (Wellbutrin), and the patient is taking for smoking cessation; OK to refill.         Passed - Patient is age 18 or older        sertraline (ZOLOFT) 25 MG tablet  Last Written Prescription Date:  6/14/18  Last Fill Quantity: 30,  # refills: 0   Last office visit: 5/15/2018 with prescribing provider:  Ryan   Future Office Visit:     30 tablet 0     Sig: Take 1 tablet (25 mg) by mouth daily    SSRIs Protocol Passed    7/23/2018  1:39 PM       Passed - Recent (12 mo) or future (30 days) visit within the authorizing provider's specialty    Patient had office visit in the last 12 months or has a visit in the next 30 days with authorizing provider or within the authorizing provider's specialty.  See \"Patient Info\" tab in inbasket, or \"Choose Columns\" in Meds & Orders section of the refill encounter.           Passed - Medication is Bupropion    If the medication is Bupropion (Wellbutrin), and the patient is taking for smoking cessation; OK to refill.         Passed - Patient is age 18 or older          "

## 2018-07-24 NOTE — TELEPHONE ENCOUNTER
PHQ-9 SCORE 3/7/2018 5/15/2018   Total Score 12 9     My Chart PHQ 9 sent to the patient.  Maddison Reyes RN

## 2018-07-26 ENCOUNTER — MYC REFILL (OUTPATIENT)
Dept: FAMILY MEDICINE | Facility: CLINIC | Age: 23
End: 2018-07-26

## 2018-07-26 DIAGNOSIS — F90.0 ATTENTION DEFICIT HYPERACTIVITY DISORDER (ADHD), PREDOMINANTLY INATTENTIVE TYPE: ICD-10-CM

## 2018-07-26 NOTE — TELEPHONE ENCOUNTER
amphetamine-dextroamphetamine (ADDERALL XR) 20 MG per 24 hr capsule    Last Written Prescription Date:  4-  Last Fill Quantity: 30 capsule,   # refills: 0  Last Office Visit: 5-  Future Office visit:       Routing refill request to provider for review/approval because:  Drug not on the [facility], [facility] or [facility] refill protocol or controlled substance      amphetamine-dextroamphetamine (ADDERALL) 10 MG per tablet    Last Written Prescription Date:  4-  Last Fill Quantity: 30 tablet,   # refills: 0  Last Office Visit: 5-  Future Office visit:       Routing refill request to provider for review/approval because:  Drug not on the [facility], [facility] or [facility] refill protocol or controlled substance

## 2018-07-26 NOTE — TELEPHONE ENCOUNTER
Message from [employer]:  Original authorizing provider: Racquel Schaeffer MD    Barrett Olivera would like a refill of the following medications:  amphetamine-dextroamphetamine (ADDERALL) 10 MG per tablet [Racquel Schaeffer MD]  amphetamine-dextroamphetamine (ADDERALL XR) 20 MG per 24 hr capsule [Racquel Schaeffer MD]    Preferred pharmacy: [facility] [place] - [address]    Comment:  I'm struggling with school and work right now. Since my sleep hasn't really improved with coming off of Adderall, I would like to re-start this. It did provide significant benefit to me during my daily responsibilities and I have noticed a big difference off of it as far as my focus and productivity goes. If I need to come in to see you to discuss this, I could do this in the next month I would guess, but I do have a lot of big projects coming up in August so I would like to have it for those.

## 2018-07-27 RX ORDER — DEXTROAMPHETAMINE SACCHARATE, AMPHETAMINE ASPARTATE MONOHYDRATE, DEXTROAMPHETAMINE SULFATE AND AMPHETAMINE SULFATE 5; 5; 5; 5 MG/1; MG/1; MG/1; MG/1
20 CAPSULE, EXTENDED RELEASE ORAL DAILY
Qty: 30 CAPSULE | Refills: 0 | Status: SHIPPED | OUTPATIENT
Start: 2018-07-27 | End: 2018-09-06

## 2018-07-27 RX ORDER — BUPROPION HYDROCHLORIDE 150 MG/1
150 TABLET ORAL DAILY
Qty: 30 TABLET | Refills: 0 | Status: SHIPPED | OUTPATIENT
Start: 2018-07-27 | End: 2018-08-27

## 2018-07-27 RX ORDER — DEXTROAMPHETAMINE SACCHARATE, AMPHETAMINE ASPARTATE, DEXTROAMPHETAMINE SULFATE AND AMPHETAMINE SULFATE 2.5; 2.5; 2.5; 2.5 MG/1; MG/1; MG/1; MG/1
10 TABLET ORAL DAILY
Qty: 30 TABLET | Refills: 0 | Status: SHIPPED | OUTPATIENT
Start: 2018-07-27 | End: 2018-09-06

## 2018-07-27 RX ORDER — SERTRALINE HYDROCHLORIDE 25 MG/1
25 TABLET, FILM COATED ORAL DAILY
Qty: 30 TABLET | Refills: 0 | Status: SHIPPED | OUTPATIENT
Start: 2018-07-27 | End: 2018-08-27

## 2018-07-27 NOTE — TELEPHONE ENCOUNTER
See below rxs.  Routing refill request to provider for review/approval because:  Drug not on the [facility] refill protocol   Yaz Velazquez RN  EP Triage

## 2018-07-27 NOTE — TELEPHONE ENCOUNTER
PHQ-9 SCORE 3/7/2018 5/15/2018 7/26/2018   Total Score MyChart - - 9 (Mild depression)   Total Score 12 9 9     Note from Dr. Davis  June 14, 2018   Lorena Davis MD        6:55 PM   Note      Script refill faxed. Remind pt to do follow up for med check and labs, since last phq was high ( we could do just phq update over phone if pt is doing ok             Routing refill request to provider for review/approval because:  PHQ 9 over 4. Yadira refill given.  Maddison Reyes RN

## 2018-09-06 ENCOUNTER — MYC REFILL (OUTPATIENT)
Dept: FAMILY MEDICINE | Facility: CLINIC | Age: 23
End: 2018-09-06

## 2018-09-06 DIAGNOSIS — F90.0 ATTENTION DEFICIT HYPERACTIVITY DISORDER (ADHD), PREDOMINANTLY INATTENTIVE TYPE: ICD-10-CM

## 2018-09-06 RX ORDER — DEXTROAMPHETAMINE SACCHARATE, AMPHETAMINE ASPARTATE MONOHYDRATE, DEXTROAMPHETAMINE SULFATE AND AMPHETAMINE SULFATE 5; 5; 5; 5 MG/1; MG/1; MG/1; MG/1
20 CAPSULE, EXTENDED RELEASE ORAL DAILY
Qty: 30 CAPSULE | Refills: 0 | Status: SHIPPED | OUTPATIENT
Start: 2018-09-06 | End: 2018-10-09

## 2018-09-06 RX ORDER — DEXTROAMPHETAMINE SACCHARATE, AMPHETAMINE ASPARTATE, DEXTROAMPHETAMINE SULFATE AND AMPHETAMINE SULFATE 2.5; 2.5; 2.5; 2.5 MG/1; MG/1; MG/1; MG/1
10 TABLET ORAL DAILY
Qty: 30 TABLET | Refills: 0 | Status: SHIPPED | OUTPATIENT
Start: 2018-09-06 | End: 2018-10-09

## 2018-09-06 NOTE — TELEPHONE ENCOUNTER
Message from [employer]:  Original authorizing provider: Racquel Schaeffer MD    Barrett Olivera would like a refill of the following medications:  amphetamine-dextroamphetamine (ADDERALL) 10 MG per tablet [Racquel Schaeffer MD]  amphetamine-dextroamphetamine (ADDERALL XR) 20 MG per 24 hr capsule [Racquel Schaeffer MD]    Preferred pharmacy: [facility] [place] - [address]    Comment:  Could I please get these filled and have someone contact me on how to set up a med check? I can't find any option to set up an appointment of that type in the online appointment set up. I can come in whenever over the next two days and into next week up until Wednesday.

## 2018-09-06 NOTE — TELEPHONE ENCOUNTER
Amphetamine/dextroamphetamine 10mg      Last Written Prescription Date:  7/27/18  Last Fill Quantity: 30,   # refills: 0  Last Office Visit: 5/15/18 Dr. Davis  Future Office visit:       Routing refill request to provider for review/approval because:  Drug not on the [facility], [facility] or [facility] refill protocol or controlled substance      Amphetamine/dextroamphetamine xr 20mg      Last Written Prescription Date:  7/27/18  Last Fill Quantity: 30,   # refills: 0  Last Office Visit: 5/15/18 Dr. Davis  Future Office visit:       Routing refill request to provider for review/approval because:  Drug not on the [facility], [facility] or [facility] refill protocol or controlled substance    [place] reviewed:  reviewed- no concerns 9/6/18    [place]:  [email or web address]-[email or web address]    Yaz Velazquez RN  EP Triage

## 2018-09-13 ENCOUNTER — OFFICE VISIT (OUTPATIENT)
Dept: FAMILY MEDICINE | Facility: CLINIC | Age: 23
End: 2018-09-13
Payer: COMMERCIAL

## 2018-09-13 VITALS
OXYGEN SATURATION: 100 % | TEMPERATURE: 97.7 F | HEART RATE: 45 BPM | HEIGHT: 74 IN | SYSTOLIC BLOOD PRESSURE: 115 MMHG | DIASTOLIC BLOOD PRESSURE: 69 MMHG | WEIGHT: 305 LBS | BODY MASS INDEX: 39.14 KG/M2

## 2018-09-13 DIAGNOSIS — F41.8 ANXIOUS DEPRESSION: ICD-10-CM

## 2018-09-13 DIAGNOSIS — Z23 NEED FOR PROPHYLACTIC VACCINATION AND INOCULATION AGAINST INFLUENZA: Primary | ICD-10-CM

## 2018-09-13 DIAGNOSIS — Z79.899 CONTROLLED SUBSTANCE AGREEMENT SIGNED: ICD-10-CM

## 2018-09-13 DIAGNOSIS — F90.0 ATTENTION DEFICIT HYPERACTIVITY DISORDER (ADHD), PREDOMINANTLY INATTENTIVE TYPE: ICD-10-CM

## 2018-09-13 PROCEDURE — 90471 IMMUNIZATION ADMIN: CPT | Performed by: FAMILY MEDICINE

## 2018-09-13 PROCEDURE — 80307 DRUG TEST PRSMV CHEM ANLYZR: CPT | Performed by: FAMILY MEDICINE

## 2018-09-13 PROCEDURE — 90686 IIV4 VACC NO PRSV 0.5 ML IM: CPT | Performed by: FAMILY MEDICINE

## 2018-09-13 PROCEDURE — 99214 OFFICE O/P EST MOD 30 MIN: CPT | Mod: 25 | Performed by: FAMILY MEDICINE

## 2018-09-13 PROCEDURE — 40000358 ZZHCL STATISTIC DRUG SCREEN MULTIPLE (METRO): Performed by: FAMILY MEDICINE

## 2018-09-13 RX ORDER — BUPROPION HYDROCHLORIDE 150 MG/1
150 TABLET ORAL EVERY MORNING
Qty: 90 TABLET | Refills: 1 | Status: SHIPPED | OUTPATIENT
Start: 2018-09-13 | End: 2019-01-14

## 2018-09-13 RX ORDER — SERTRALINE HYDROCHLORIDE 25 MG/1
25 TABLET, FILM COATED ORAL DAILY
Qty: 90 TABLET | Refills: 1 | Status: SHIPPED | OUTPATIENT
Start: 2018-09-13 | End: 2019-01-14

## 2018-09-13 ASSESSMENT — ANXIETY QUESTIONNAIRES
5. BEING SO RESTLESS THAT IT IS HARD TO SIT STILL: SEVERAL DAYS
7. FEELING AFRAID AS IF SOMETHING AWFUL MIGHT HAPPEN: NOT AT ALL
6. BECOMING EASILY ANNOYED OR IRRITABLE: SEVERAL DAYS
2. NOT BEING ABLE TO STOP OR CONTROL WORRYING: NOT AT ALL
1. FEELING NERVOUS, ANXIOUS, OR ON EDGE: SEVERAL DAYS
GAD7 TOTAL SCORE: 4
3. WORRYING TOO MUCH ABOUT DIFFERENT THINGS: NOT AT ALL

## 2018-09-13 ASSESSMENT — PATIENT HEALTH QUESTIONNAIRE - PHQ9: 5. POOR APPETITE OR OVEREATING: SEVERAL DAYS

## 2018-09-13 NOTE — MR AVS SNAPSHOT
After Visit Summary   9/13/2018    Barrett Olivera    MRN: [id number]           Patient Information     Date Of Birth          1995        Visit Information        Provider Department      9/13/2018 7:20 AM Lorena Davis MD [facility] [place]        Today's Diagnoses     Need for prophylactic vaccination and inoculation against influenza    -  1    Anxious depression        Attention deficit hyperactivity disorder (ADHD), predominantly inattentive type          Care Instructions    Take medications as directed.  Treatment  and symptomatic cares discussed   Follow up if problem or concern             Follow-ups after your visit        Follow-up notes from your care team     Return in about 6 months (around 3/13/2019), or if symptoms worsen or fail to improve.      Who to contact     If you have questions or need follow up information about today's clinic visit or your schedule please contact [facility] [place] directly at [phone].  Normal or non-critical lab and imaging results will be communicated to you by MyChart, letter or phone within 4 business days after the clinic has received the results. If you do not hear from us within 7 days, please contact the clinic through [employer] or phone. If you have a critical or abnormal lab result, we will notify you by phone as soon as possible.  Submit refill requests through [employer] or call your pharmacy and they will forward the refill request to us. Please allow 3 business days for your refill to be completed.          Additional Information About Your Visit        MyChart Information     [employer] gives you secure access to your electronic health record. If you see a primary care provider, you can also send messages to your care team and make appointments. If you have questions, please call your primary care clinic.  If you do not have a primary care provider, please call [phone] and they will assist you.        Care  "EveryWhere ID     This is your Care EveryWhere ID. This could be used by other organizations to access your [facility] medical records  [id number]        Your Vitals Were     Pulse Temperature Height Pulse Oximetry BMI (Body Mass Index)       45 97.7  F (36.5  C) (Tympanic) 6' 1.82\" (1.875 m) 100% 39.35 kg/m2        Blood Pressure from Last 3 Encounters:   09/13/18 115/69   05/15/18 132/82   03/15/18 161/76    Weight from Last 3 Encounters:   09/13/18 305 lb (138.3 kg)   05/15/18 313 lb (142 kg)   03/06/18 (!) 326 lb (147.9 kg)              We Performed the Following     Drug screen urine     FLU VACCINE, SPLIT VIRUS, IM (QUADRIVALENT) [73727]- >3 YRS     Vaccine Administration, Initial [71121]          Today's Medication Changes          These changes are accurate as of 9/13/18  7:54 AM.  If you have any questions, ask your nurse or doctor.               These medicines have changed or have updated prescriptions.        Dose/Directions    buPROPion 150 MG 24 hr tablet   Commonly known as:  WELLBUTRIN XL   This may have changed:  See the new instructions.   Used for:  Anxious depression   Changed by:  Lorena Davis MD        Dose:  150 mg   Take 1 tablet (150 mg) by mouth every morning   Quantity:  90 tablet   Refills:  1       sertraline 25 MG tablet   Commonly known as:  ZOLOFT   This may have changed:  See the new instructions.   Used for:  Anxious depression   Changed by:  Lorena Davis MD        Dose:  25 mg   Take 1 tablet (25 mg) by mouth daily   Quantity:  90 tablet   Refills:  1            Where to get your medicines      These medications were sent to [facility] [place] - [address]  [address], [place] [address]     Phone:  [phone]     buPROPion 150 MG 24 hr tablet    sertraline 25 MG tablet                Primary Care Provider Office Phone # Fax #    Lorena Davis [phone]       [address] [place], [place] [address] [place] [address]        Equal Access to Services     OSMEL [facility] : Hadii gerard greene hadnoblepeter Socyndi, waaxda luqadaha, qaybta kaceymaluis [place], neha bishop. So [facility] [phone].    ATENCIÓN: Si habla español, tiene a pierson disposición servicios gratuitos de asistencia lingüística. LlLouis [facility] [phone].    We comply with applicable federal civil rights laws and [place] laws. We do not discriminate on the basis of race, color, national origin, age, disability, sex, sexual orientation, or gender identity.            Thank you!     Thank you for choosing [facility] [place]  for your care. Our goal is always to provide you with excellent care. Hearing back from our patients is one way we can continue to improve our services. Please take a few minutes to complete the written survey that you may receive in the mail after your visit with us. Thank you!             Your Updated Medication List - Protect others around you: Learn how to safely use, store and throw away your medicines at www.disposemymeds.org.          This list is accurate as of 9/13/18  7:54 AM.  Always use your most recent med list.                   Brand Name Dispense Instructions for use Diagnosis    * amphetamine-dextroamphetamine 10 MG per tablet    ADDERALL    30 tablet    Take 1 tablet (10 mg) by mouth daily    Attention deficit hyperactivity disorder (ADHD), predominantly inattentive type       * amphetamine-dextroamphetamine 20 MG per 24 hr capsule    ADDERALL XR    30 capsule    Take 1 capsule (20 mg) by mouth daily    Attention deficit hyperactivity disorder (ADHD), predominantly inattentive type       buPROPion 150 MG 24 hr tablet    WELLBUTRIN XL    90 tablet    Take 1 tablet (150 mg) by mouth every morning    Anxious depression       sertraline 25 MG tablet    ZOLOFT    90 tablet    Take 1 tablet (25 mg) by mouth daily    Anxious depression       * Notice:  This list has 2 medication(s)  that are the same as other medications prescribed for you. Read the directions carefully, and ask your doctor or other care provider to review them with you.

## 2018-09-13 NOTE — PROGRESS NOTES
SUBJECTIVE:   Barrett Olivera is a 23 year old male who presents to clinic today for the following health issues:      Medication Followup of  Adderrall     Taking Medication as prescribed: yes    Side Effects:  None    Medication Helping Symptoms:  yes         Depression and Anxiety Follow-Up/ ADHD      Status since last visit: Improved doing much better since adding Zoloft not as anxious ,BP  also has improved.   Trying to exercise more and has lost weight etc .     ADD med's are working well denies any problem taking medications.  Actually has helped with his concentration as well.     Other associated symptoms:See phq-9 and poli 7        Complicating factors: none     Significant life event: No     Current substance abuse: None    PHQ-9 5/15/2018 7/26/2018 9/13/2018   Total Score 9 9 3   Q9: Suicide Ideation Not at all Not at all Not at all     POLI-7 SCORE 3/7/2018 5/15/2018 9/13/2018   Total Score 15 17 4       PHQ-9  English  PHQ-9   Any Language  POLI-7  Suicide Assessment Five-step Evaluation and Treatment (SAFE-T)    Problem list and histories reviewed & adjusted, as indicated.  Additional history: as documented    Patient Active Problem List   Diagnosis     Attention deficit hyperactivity disorder (ADHD), predominantly inattentive type     Anxious depression     Elevated blood pressure reading without diagnosis of hypertension     BMI 40.0-44.9, adult (H)     Obesity, morbid, BMI 40.0-49.9 (H)     Panic attack     Past Surgical History:   Procedure Laterality Date     KNEE SURGERY      X2 ON RT, ONE ON LEFT, INJURY RELATED ,        Social History   Substance Use Topics     Smoking status: Never Smoker     Smokeless tobacco: Never Used     Alcohol use Yes     Family History   Problem Relation Age of Onset     Breast Cancer Mother      Breast Cancer Maternal Grandmother      Thyroid Disease Other      paternal aunt low on thyroid      Diabetes No family hx of      Coronary Artery Disease No family hx of       "Hypertension No family hx of      Hyperlipidemia No family hx of      Prostate Cancer No family hx of      Colon Cancer No family hx of            Reviewed and updated as needed this visit by clinical staff  Tobacco  Allergies  Meds       Reviewed and updated as needed this visit by Provider         ROS:  Constitutional, HEENT, cardiovascular, pulmonary, GI, , musculoskeletal, neuro, skin, endocrine and psych systems are negative, except as otherwise noted.    OBJECTIVE:     /69  Pulse (!) 45  Temp 97.7  F (36.5  C) (Tympanic)  Ht 6' 1.82\" (1.875 m)  Wt 305 lb (138.3 kg)  SpO2 100%  BMI 39.35 kg/m2  Body mass index is 39.35 kg/(m^2).  GENERAL: healthy, alert and no distress  EYES: Eyes grossly normal to inspection, PERRL and conjunctivae and sclerae normal  NECK: no adenopathy, no asymmetry, masses, or scars and thyroid normal to palpation  RESP: lungs clear to auscultation - no rales, rhonchi or wheezes  CV: regular rate and rhythm, normal S1 S2, no S3 or S4, no murmur, click or rub, no peripheral edema and peripheral pulses strong  NEURO: Normal strength and tone, mentation intact and speech normal  PSYCH: mentation appears normal, affect normal/bright        ASSESSMENT/PLAN:       (F41.8) Anxious depression  Comment:   Plan: sertraline (ZOLOFT) 25 MG tablet, buPROPion         (WELLBUTRIN XL) 150 MG 24 hr tablet, Drug         screen urine        Improved and doing well on current med's. Willing to continue with same med's. Pros/ cons of med's discussed . encouraged to see  to help and referral given . spent sometimes counseling patient. Follow up in 4-6 months, sooner if problem.       (F90.0) Attention deficit hyperactivity disorder (ADHD), predominantly inattentive type  Comment: doing well on current med's, willing to proceed with random drug screen today   Plan: Drug screen urine      (Z79.899) Controlled substance agreement signed  Comment: 03/2018 , in place   Plan:  Drug screen " urine      (Z23) Need for prophylactic vaccination and inoculation against influenza  (primary encounter diagnosis)  Comment:   Plan: FLU VACCINE, SPLIT VIRUS, IM (QUADRIVALENT)         [49589]- >3 YRS, Vaccine Administration,         Initial [00113]                Patient expressed understanding and agreement with treatment plan. All patient's questions were answered, will let me know if has more later.  Medications: Rx's: Reviewed the potential side effects/complications of medications prescribed.       Lorena Davis [place]  [facility] [place]      Injectable Influenza Immunization Documentation    1.  Is the person to be vaccinated sick today?   No    2. Does the person to be vaccinated have an allergy to a component   of the vaccine?   No  Egg Allergy Algorithm Link    3. Has the person to be vaccinated ever had a serious reaction   to influenza vaccine in the past?   No    4. Has the person to be vaccinated ever had Guillain-Barré syndrome?   No    Form completed by af

## 2018-09-14 ASSESSMENT — PATIENT HEALTH QUESTIONNAIRE - PHQ9: SUM OF ALL RESPONSES TO PHQ QUESTIONS 1-9: 3

## 2018-09-14 ASSESSMENT — ANXIETY QUESTIONNAIRES: GAD7 TOTAL SCORE: 4

## 2018-09-17 LAB
ACETAMINOPHEN QUAL: NEGATIVE
AMANTADINE: NEGATIVE
AMITRIPTYLINE QUAL: NEGATIVE
AMOXAPINE: NEGATIVE
AMPHETAMINES QUAL: NEGATIVE
ATROPINE: NEGATIVE
BENZODIAZ UR QL: NEGATIVE
CAFFEINE QUAL: POSITIVE
CANNABINOIDS UR QL SCN: POSITIVE
CARBAMAZEPINE QUAL: NEGATIVE
CHLORPHENIRAMINE: NEGATIVE
CHLORPROMAZINE: NEGATIVE
CITALOPRAM QUAL: NEGATIVE
CLOMIPRAMINE QUAL: NEGATIVE
COCAINE QUAL: NEGATIVE
COCAINE UR QL: NEGATIVE
CODEINE QUAL: NEGATIVE
DESIPRAMINE QUAL: NEGATIVE
DEXTROMETHORPHAN: NEGATIVE
DIPHENHYDRAMINE: POSITIVE
DOXEPIN/METABOLITE: NEGATIVE
DOXYLAMINE: NEGATIVE
EPHEDRINE OR PSEUDO: NEGATIVE
FENTANYL QUAL: NEGATIVE
FLUOXETINE AND METAB: NEGATIVE
HYDROCODONE QUAL: NEGATIVE
HYDROMORPHONE QUAL: NEGATIVE
IBUPROFEN QUAL: NEGATIVE
IMIPRAMINE QUAL: NEGATIVE
LAMOTRIGINE QUAL: NEGATIVE
LOXAPINE: NEGATIVE
MAPROTYLINE: NEGATIVE
MDMA QUAL: NEGATIVE
MEPERIDINE QUAL: NEGATIVE
METHAMPHETAMINE: NEGATIVE
METHODONE QUAL: NEGATIVE
MORPHINE QUAL: NEGATIVE
NICOTINE: POSITIVE
NORTRIPTYLINE QUAL: NEGATIVE
OLANZAPINE QUAL: NEGATIVE
OPIATES UR QL SCN: NEGATIVE
OXYCODONE QUAL: NEGATIVE
PENTAZOCINE: NEGATIVE
PHENCYCLIDINE QUAL: NEGATIVE
PHENMETRAZINE: NEGATIVE
PHENTERMINE: NEGATIVE
PHENYLBUTAZONE: NEGATIVE
PHENYLPROPANOLAMINE: NEGATIVE
PROPOXPHENE QUAL: NEGATIVE
PROPRANOLOL QUAL: NEGATIVE
PYRILAMINE: NEGATIVE
SALICYLATE QUAL: NEGATIVE
THEOBROMINE: POSITIVE
TOPIRAMATE QUAL: NEGATIVE
TRIMIPRAMINE QUAL: NEGATIVE
VENLAFAXINE QUAL: NEGATIVE

## 2018-09-26 ENCOUNTER — OFFICE VISIT (OUTPATIENT)
Dept: FAMILY MEDICINE | Facility: CLINIC | Age: 23
End: 2018-09-26
Payer: COMMERCIAL

## 2018-09-26 VITALS
DIASTOLIC BLOOD PRESSURE: 80 MMHG | WEIGHT: 312 LBS | HEART RATE: 102 BPM | SYSTOLIC BLOOD PRESSURE: 130 MMHG | OXYGEN SATURATION: 97 % | BODY MASS INDEX: 40.25 KG/M2 | TEMPERATURE: 98.9 F

## 2018-09-26 DIAGNOSIS — F90.0 ATTENTION DEFICIT HYPERACTIVITY DISORDER (ADHD), PREDOMINANTLY INATTENTIVE TYPE: Primary | ICD-10-CM

## 2018-09-26 DIAGNOSIS — S49.92XS INJURY OF LEFT SHOULDER, SEQUELA: ICD-10-CM

## 2018-09-26 PROCEDURE — 99214 OFFICE O/P EST MOD 30 MIN: CPT | Performed by: FAMILY MEDICINE

## 2018-09-26 RX ORDER — CETIRIZINE HYDROCHLORIDE 10 MG/1
10 TABLET ORAL DAILY
COMMUNITY

## 2018-09-26 NOTE — PROGRESS NOTES
SUBJECTIVE:   Barrett Olivera is a 23 year old male who presents to clinic today for the following health issues:      Concern - lab result   Onset: 9/13/2018    Description:   Pt coming in for lab results .  Patient had a drug screen recently came back positive for cannabis.,  Negative for all.  It was discussed results and follow-up.     Intensity:     Progression of Symptoms:      Accompanying Signs & Symptoms: None      Previous history of similar problem:   He has used marijuana in the past although denies using any controlled substance of prescriptions or marijuana since the last contract signed.  He denies any use recently.  He does do some over-the-counter protein supplements that he buys from [employer], unsure if that could be affecting the excretion.     Precipitating factors:   Worsened by:     Alleviating factors:  Improved by:     Therapies Tried and outcome:     PROBLEMS TO ADD ON...  He is also concerned with ongoing left shoulder problem over the years.  He had an injury I think playing football was quite sore will work with just his  and did not see  at the time.  His pain had improved after  week or two, although he still feel like his joint feels unstable,  with certain motions it bothers him sometimes.  Pain is really not much a problem  for him.   he does do weightlifting so it bothers him with certain exercises.  He wish to get a referral to see orthopedic.     Problem list and histories reviewed & adjusted, as indicated.  Additional history: as documented    Patient Active Problem List   Diagnosis     Attention deficit hyperactivity disorder (ADHD), predominantly inattentive type     Anxious depression     Elevated blood pressure reading without diagnosis of hypertension     BMI 40.0-44.9, adult (H)     Obesity, morbid, BMI 40.0-49.9 (H)     Panic attack     Controlled substance agreement signed     Past Surgical History:   Procedure Laterality Date     KNEE SURGERY      X2 ON RT, ONE  ON LEFT, INJURY RELATED ,        Social History   Substance Use Topics     Smoking status: Never Smoker     Smokeless tobacco: Never Used     Alcohol use Yes     Family History   Problem Relation Age of Onset     Breast Cancer Mother      Breast Cancer Maternal Grandmother      Thyroid Disease Other      paternal aunt low on thyroid      Diabetes No family hx of      Coronary Artery Disease No family hx of      Hypertension No family hx of      Hyperlipidemia No family hx of      Prostate Cancer No family hx of      Colon Cancer No family hx of            Reviewed and updated as needed this visit by clinical staff  Tobacco  Allergies  Meds       Reviewed and updated as needed this visit by Provider         ROS:  Constitutional, HEENT, cardiovascular, pulmonary, GI, , musculoskeletal, neuro, skin, endocrine and psych systems are negative, except as otherwise noted.    OBJECTIVE:     /85  Pulse 102  Temp 98.9  F (37.2  C) (Oral)  Wt 312 lb (141.5 kg)  SpO2 97%  BMI 40.25 kg/m2  Body mass index is 40.25 kg/(m^2).  GENERAL: healthy, alert and no distress  NECK: no adenopathy, no asymmetry, masses, or scars and thyroid normal to palpation  RESP: lungs clear to auscultation - no rales, rhonchi or wheezes  CV: regular rate and rhythm, normal S1 S2, no S3 or S4,  Patient has muscular well-built iza, left shoulder without any acute tenderness and he has a good range of motion, who he does have a bit of a click in his shoulder with  certain movements,  Especially rotations   ABDOMEN: soft, nontender, no hepatosplenomegaly, no masses and bowel sounds normal  PSYCH: mentation appears normal, affect normal        ASSESSMENT/PLAN:         (F90.0) Attention deficit hyperactivity disorder (ADHD), predominantly inattentive type  (primary encounter diagnosis)  Comment:   Plan: Discussed recent lab results.  His drug screen showed positive for cannabis O and the blood .He actually has been out of Adderall a week or so  before the drug screen .  Although he  denies using any street drugs or  prescription medications,  since he recently signed a contract although he admits  Previously he used marijuana . plans to stay sober now.  We will follow-up in couple months sooner if any problem.  Reminded patient about control substance  agreement and compliance.  He verbalized understanding.     (S49.92XS) Injury of left shoulder, sequela  Comment: instability   Plan: cetirizine (ZYRTEC) 10 MG tablet, ORTHOPEDICS         ADULT REFERRAL        Case discussed.  referral given follow-up as needed.       Lorena Davis MD  [facility] [place]

## 2018-09-26 NOTE — MR AVS SNAPSHOT
After Visit Summary   9/26/2018    Barrett Olivera    MRN: [id number]           Patient Information     Date Of Birth          1995        Visit Information        Provider Department      9/26/2018 2:40 PM Lorena Davis MD [facility] [place]        Today's Diagnoses     Attention deficit hyperactivity disorder (ADHD), predominantly inattentive type    -  1    Injury of left shoulder, sequela          Care Instructions    Take medications as directed.  Cares and symptomatic cares discussed   Follow up if problem or concern             Follow-ups after your visit        Additional Services     ORTHOPEDICS ADULT REFERRAL       Your provider has referred you to: [facility]: [facility] - [place] -  [facility]  [phone]   [email or web address]    Please be aware that coverage of these services is subject to the terms and limitations of your health insurance plan.  Call member services at your health plan with any benefit or coverage questions.      Please bring the following to your appointment:    >>   Any x-rays, CTs or MRIs which have been performed.  Contact the facility where they were done to arrange for  prior to your scheduled appointment.    >>   List of current medications   >>   This referral request   >>   Any documents/labs given to you for this referral                  Follow-up notes from your care team     Return in about 1 month (around 10/26/2018), or if symptoms worsen or fail to improve.      Who to contact     If you have questions or need follow up information about today's clinic visit or your schedule please contact [facility] [place] directly at [phone].  Normal or non-critical lab and imaging results will be communicated to you by MyChart, letter or phone within 4 business days after the clinic has received the results. If you do  not hear from us within 7 days, please contact the clinic through [employer] or phone. If you have a critical or abnormal lab result, we will notify you by phone as soon as possible.  Submit refill requests through [employer] or call your pharmacy and they will forward the refill request to us. Please allow 3 business days for your refill to be completed.          Additional Information About Your Visit        [employer] Information     [employer] gives you secure access to your electronic health record. If you see a primary care provider, you can also send messages to your care team and make appointments. If you have questions, please call your primary care clinic.  If you do not have a primary care provider, please call [phone] and they will assist you.        Care EveryWhere ID     This is your Care EveryWhere ID. This could be used by other organizations to access your [facility] medical records  [id number]        Your Vitals Were     Pulse Temperature Pulse Oximetry BMI (Body Mass Index)          102 98.9  F (37.2  C) (Oral) 97% 40.25 kg/m2         Blood Pressure from Last 3 Encounters:   09/26/18 130/80   09/13/18 115/69   05/15/18 132/82    Weight from Last 3 Encounters:   09/26/18 312 lb (141.5 kg)   09/13/18 305 lb (138.3 kg)   05/15/18 313 lb (142 kg)              We Performed the Following     ORTHOPEDICS ADULT REFERRAL        Primary Care Provider Office Phone # Fax #    Lorena Demetrio Davis [phone]       [address]  [place] [address]        Equal Access to Services     [facility]: Hadii aad ku hadasho Soomaali, waaxda luqadaha, qaybta kaalmada adeegyada, neha victoria . So [facility] [phone].    ATENCIÓN: Si habla español, tiene a pierson disposición servicios gratuitos de asistencia lingüística. Llame al [phone].    We comply with applicable federal civil rights laws and [place] laws. We do not discriminate on the basis of race, color,  national origin, age, disability, sex, sexual orientation, or gender identity.            Thank you!     Thank you for choosing [facility] [place]  for your care. Our goal is always to provide you with excellent care. Hearing back from our patients is one way we can continue to improve our services. Please take a few minutes to complete the written survey that you may receive in the mail after your visit with us. Thank you!             Your Updated Medication List - Protect others around you: Learn how to safely use, store and throw away your medicines at www.disposemymeds.org.          This list is accurate as of 9/26/18  3:31 PM.  Always use your most recent med list.                   Brand Name Dispense Instructions for use Diagnosis    * amphetamine-dextroamphetamine 10 MG per tablet    ADDERALL    30 tablet    Take 1 tablet (10 mg) by mouth daily    Attention deficit hyperactivity disorder (ADHD), predominantly inattentive type       * amphetamine-dextroamphetamine 20 MG per 24 hr capsule    ADDERALL XR    30 capsule    Take 1 capsule (20 mg) by mouth daily    Attention deficit hyperactivity disorder (ADHD), predominantly inattentive type       buPROPion 150 MG 24 hr tablet    WELLBUTRIN XL    90 tablet    Take 1 tablet (150 mg) by mouth every morning    Anxious depression       cetirizine 10 MG tablet    zyrTEC     Take 10 mg by mouth daily    Injury of left shoulder, sequela       sertraline 25 MG tablet    ZOLOFT    90 tablet    Take 1 tablet (25 mg) by mouth daily    Anxious depression       * Notice:  This list has 2 medication(s) that are the same as other medications prescribed for you. Read the directions carefully, and ask your doctor or other care provider to review them with you.

## 2018-10-02 ENCOUNTER — RADIANT APPOINTMENT (OUTPATIENT)
Dept: GENERAL RADIOLOGY | Facility: CLINIC | Age: 23
End: 2018-10-02
Attending: FAMILY MEDICINE
Payer: COMMERCIAL

## 2018-10-02 ENCOUNTER — OFFICE VISIT (OUTPATIENT)
Dept: ORTHOPEDICS | Facility: CLINIC | Age: 23
End: 2018-10-02
Payer: COMMERCIAL

## 2018-10-02 VITALS
BODY MASS INDEX: 40.04 KG/M2 | WEIGHT: 312 LBS | DIASTOLIC BLOOD PRESSURE: 83 MMHG | HEIGHT: 74 IN | SYSTOLIC BLOOD PRESSURE: 135 MMHG

## 2018-10-02 DIAGNOSIS — M25.312 SHOULDER INSTABILITY, LEFT: ICD-10-CM

## 2018-10-02 DIAGNOSIS — M25.512 PAIN IN JOINT OF LEFT SHOULDER: Primary | ICD-10-CM

## 2018-10-02 DIAGNOSIS — M25.512 PAIN IN JOINT OF LEFT SHOULDER: ICD-10-CM

## 2018-10-02 PROCEDURE — 73030 X-RAY EXAM OF SHOULDER: CPT | Mod: LT

## 2018-10-02 PROCEDURE — 99243 OFF/OP CNSLTJ NEW/EST LOW 30: CPT | Performed by: FAMILY MEDICINE

## 2018-10-02 NOTE — PROGRESS NOTES
[place]   [facility]   Clinic Visit s Oct 2, 2018    PCP: Lorena Davis      Barrett is a 23 year old male who is seen in consultation at the request of Dr. Davis for   Chief Complaint   Patient presents with     Left Shoulder - Pain       Injury: Patient describes injury as playing football and landed directly on his LEFT  elbow and felt his shoulder pop. Used aditya sling for the remainder of his season.      Right hand dominant      Location of Pain: left shoulder posterior, nonradiating   Duration of Pain: 5 year(s)  Rating of Pain at worst: 2/10  Rating of Pain Currently: 0/10  Pain is better with: activity avoidance   Pain is worse with: pushing   Treatment so far consists of: home exercises  Associated symptoms: feeling of instability  Recent imaging completed: No recent imaging completed.  Prior History of related problems: none    Lifts weights cautiously, sometimes feels unstable with front raise, linh with bench press    Social History: is unemployed     Past Medical History:   Diagnosis Date     ADD (attention deficit disorder)     DIAGNOSED 2013      Anxiety     DIAGNOSED AT AGE 21        Patient Active Problem List    Diagnosis Date Noted     Controlled substance agreement signed 09/13/2018     Priority: Medium     03/2018 , in place        BMI 40.0-44.9, adult (H) 05/15/2018     Priority: Medium     Obesity, morbid, BMI 40.0-49.9 (H) 05/15/2018     Priority: Medium     Panic attack 05/15/2018     Priority: Medium     Attention deficit hyperactivity disorder (ADHD), predominantly inattentive type 03/06/2018     Priority: Medium     Anxious depression 03/06/2018     Priority: Medium     Elevated blood pressure reading without diagnosis of hypertension 03/06/2018     Priority: Medium       Family History   Problem Relation Age of Onset     Breast Cancer Mother      Breast Cancer Maternal Grandmother      Thyroid Disease Other      paternal aunt low on thyroid      Diabetes No family  "hx of      Coronary Artery Disease No family hx of      Hypertension No family hx of      Hyperlipidemia No family hx of      Prostate Cancer No family hx of      Colon Cancer No family hx of        Social History     Social History     Marital status: Single     Spouse name: N/A     Number of children: N/A     Years of education: N/A     Social History Main Topics     Smoking status: Never Smoker     Smokeless tobacco: Never Used     Alcohol use Yes       Past Surgical History:   Procedure Laterality Date     KNEE SURGERY      X2 ON RT, ONE ON LEFT, INJURY RELATED ,          Review of Systems   Musculoskeletal: Positive for joint pain.   All other systems reviewed and are negative.        Physical Exam  /83  Ht 6' 2\" (1.88 m)  Wt 312 lb (141.5 kg)  BMI 40.06 kg/m2  Constitutional:well-developed, well-nourished, and in no distress.   Cardiovascular: Intact distal pulses.    Neurological: alert. Gait Normal:   Gait, station, stance, and balance appear normal for age  Skin: Skin is warm and dry.   Psychiatric: Mood and affect normal.   Respiratory: unlabored, speaks in full sentences  Lymph: no LAD, no lymphangitis      Left Shoulder Exam     Tenderness   The patient is experiencing tenderness in the posterior shoulder.    Range of Motion   Active Abduction:                       Normal  Passive Abduction:                    Normal  Extension:                                  Normal  Forward Flexion:                        Normal  External Rotation:                      Normal  Internal Rotation 0 degrees:      Normal  Internal Rotation 90 degrees:    Normal    Muscle Strength   Abduction:            5/5  Internal Rotation:  5/5  External Rotation: 5/5  Supraspinatus:     5/5  Subscapularis:     5/5  Biceps:                 5/5    Tests   Impingement:   Negative  Lopez:          Negative  Cross Arm:      Negative  Drop Arm:        Negative  Apprehension: Negative  Sulcus:            Positive    Comments:  " Pos posterior apprehension, + clicking with post drawer          X-ray images Ordered and independently reviewed by me in the office today with the patient. X-ray shows: concerning for possible posterior glenoid fracture, old.     ASSESSMENT/PLAN    ICD-10-CM    1. Pain in joint of left shoulder M25.512 XR Shoulder Left G/E 3 Views     PHYSICAL THERAPY REFERRAL   2. Shoulder instability, left M25.312 PHYSICAL THERAPY REFERRAL     chronic shoulder instability without specific acute dislocation event, xr suspicious for glenoid fracture chronic, posterior. Will start with PHYSICAL THERAPY, pt opted for [facility]ERAPY near his home.     Recheck 6-12 weeks, depending on progress.

## 2018-10-02 NOTE — MR AVS SNAPSHOT
After Visit Summary   10/2/2018    Barrett Olivera    MRN: [id number]           Patient Information     Date Of Birth          1995        Visit Information        Provider Department      10/2/2018 9:20 AM Jose Escobar MD [facility] [place]        Today's Diagnoses     Pain in joint of left shoulder    -  1    Shoulder instability, left           Follow-ups after your visit        Additional Services     PHYSICAL THERAPY REFERRAL                 Future tests that were ordered for you today     Open Future Orders        Priority Expected Expires Ordered    PHYSICAL THERAPY REFERRAL Routine  10/2/2019 10/2/2018            Who to contact     If you have questions or need follow up information about today's clinic visit or your schedule please contact [facility] [place] directly at [phone].  Normal or non-critical lab and imaging results will be communicated to you by QBotixhart, letter or phone within 4 business days after the clinic has received the results. If you do not hear from us within 7 days, please contact the clinic through QBotixhart or phone. If you have a critical or abnormal lab result, we will notify you by phone as soon as possible.  Submit refill requests through [email or web address] or call your pharmacy and they will forward the refill request to us. Please allow 3 business days for your refill to be completed.          Additional Information About Your Visit        MyChart Information     [email or web address] gives you secure access to your electronic health record. If you see a primary care provider, you can also send messages to your care team and make appointments. If you have questions, please call your primary care clinic.  If you do not have a primary care provider, please call [phone] and they will assist you.        Care EveryWhere ID     This is your Care EveryWhere ID. This could be used by other organizations to access  "your [facility] medical records  [id number]        Your Vitals Were     Height BMI (Body Mass Index)                6' 2\" (1.88 m) 40.06 kg/m2           Blood Pressure from Last 3 Encounters:   10/02/18 135/83   09/26/18 130/80   09/13/18 115/69    Weight from Last 3 Encounters:   10/02/18 312 lb (141.5 kg)   09/26/18 312 lb (141.5 kg)   09/13/18 305 lb (138.3 kg)               Primary Care Provider Office Phone # Fax #    Lorena Demetrio Davis [phone]       [address]  [place] [address]        Equal Access to Services     : Hadii gerard greene hadasho Soomaali, waaxda luqadaha, qaybta kaalmada adeegyada, waxyahir victoria . So [facility] [phone].    ATENCIÓN: Si habla español, tiene a pierson disposición servicios gratuitos de asistencia lingüística. [facility] [phone].    We comply with applicable federal civil rights laws and [place] laws. We do not discriminate on the basis of race, color, national origin, age, disability, sex, sexual orientation, or gender identity.            Thank you!     Thank you for choosing [facility] [place]  for your care. Our goal is always to provide you with excellent care. Hearing back from our patients is one way we can continue to improve our services. Please take a few minutes to complete the written survey that you may receive in the mail after your visit with us. Thank you!             Your Updated Medication List - Protect others around you: Learn how to safely use, store and throw away your medicines at www.disposemymeds.org.          This list is accurate as of 10/2/18 10:15 AM.  Always use your most recent med list.                   Brand Name Dispense Instructions for use Diagnosis    * amphetamine-dextroamphetamine 10 MG per tablet    ADDERALL    30 tablet    Take 1 tablet (10 mg) by mouth daily    Attention deficit hyperactivity disorder (ADHD), predominantly inattentive type "       * amphetamine-dextroamphetamine 20 MG per 24 hr capsule    ADDERALL XR    30 capsule    Take 1 capsule (20 mg) by mouth daily    Attention deficit hyperactivity disorder (ADHD), predominantly inattentive type       buPROPion 150 MG 24 hr tablet    WELLBUTRIN XL    90 tablet    Take 1 tablet (150 mg) by mouth every morning    Anxious depression       cetirizine 10 MG tablet    zyrTEC     Take 10 mg by mouth daily    Injury of left shoulder, sequela       sertraline 25 MG tablet    ZOLOFT    90 tablet    Take 1 tablet (25 mg) by mouth daily    Anxious depression       * Notice:  This list has 2 medication(s) that are the same as other medications prescribed for you. Read the directions carefully, and ask your doctor or other care provider to review them with you.

## 2018-10-02 NOTE — LETTER
10/2/2018         RE: Barrett Olivera  [address] [facility] [address]        Dear Colleague,    Thank you for referring your patient, Barrett Olivera, to the [facility] [place]. Please see a copy of my visit note below.    HPI   [facility]   Clinic Visit s Oct 2, 2018    PCP: Lorena Davis      Barrett is a 23 year old male who is seen in consultation at the request of Dr. Davis for   Chief Complaint   Patient presents with     Left Shoulder - Pain       Injury: Patient describes injury as playing football and landed directly on his LEFT  elbow and felt his shoulder pop. Used aditya sling for the remainder of his season.      Right hand dominant      Location of Pain: left shoulder posterior, nonradiating   Duration of Pain: 5 year(s)  Rating of Pain at worst: 2/10  Rating of Pain Currently: 0/10  Pain is better with: activity avoidance   Pain is worse with: pushing   Treatment so far consists of: home exercises  Associated symptoms: feeling of instability  Recent imaging completed: No recent imaging completed.  Prior History of related problems: none    Lifts weights cautiously, sometimes feels unstable with front raise, linh with bench press    Social History: is unemployed     Past Medical History:   Diagnosis Date     ADD (attention deficit disorder)     DIAGNOSED 2013      Anxiety     DIAGNOSED AT AGE 21        Patient Active Problem List    Diagnosis Date Noted     Controlled substance agreement signed 09/13/2018     Priority: Medium     03/2018 , in place        BMI 40.0-44.9, adult (H) 05/15/2018     Priority: Medium     Obesity, morbid, BMI 40.0-49.9 (H) 05/15/2018     Priority: Medium     Panic attack 05/15/2018     Priority: Medium     Attention deficit hyperactivity disorder (ADHD), predominantly inattentive type 03/06/2018     Priority: Medium     Anxious depression 03/06/2018     Priority: Medium     Elevated blood pressure reading without diagnosis  "of hypertension 03/06/2018     Priority: Medium       Family History   Problem Relation Age of Onset     Breast Cancer Mother      Breast Cancer Maternal Grandmother      Thyroid Disease Other      paternal aunt low on thyroid      Diabetes No family hx of      Coronary Artery Disease No family hx of      Hypertension No family hx of      Hyperlipidemia No family hx of      Prostate Cancer No family hx of      Colon Cancer No family hx of        Social History     Social History     Marital status: Single     Spouse name: N/A     Number of children: N/A     Years of education: N/A     Social History Main Topics     Smoking status: Never Smoker     Smokeless tobacco: Never Used     Alcohol use Yes       Past Surgical History:   Procedure Laterality Date     KNEE SURGERY      X2 ON RT, ONE ON LEFT, INJURY RELATED ,          Review of Systems   Musculoskeletal: Positive for joint pain.   All other systems reviewed and are negative.        Physical Exam  /83  Ht 6' 2\" (1.88 m)  Wt 312 lb (141.5 kg)  BMI 40.06 kg/m2  Constitutional:well-developed, well-nourished, and in no distress.   Cardiovascular: Intact distal pulses.    Neurological: alert. Gait Normal:   Gait, station, stance, and balance appear normal for age  Skin: Skin is warm and dry.   Psychiatric: Mood and affect normal.   Respiratory: unlabored, speaks in full sentences  Lymph: no LAD, no lymphangitis      Left Shoulder Exam     Tenderness   The patient is experiencing tenderness in the posterior shoulder.    Range of Motion   Active Abduction:                       Normal  Passive Abduction:                    Normal  Extension:                                  Normal  Forward Flexion:                        Normal  External Rotation:                      Normal  Internal Rotation 0 degrees:      Normal  Internal Rotation 90 degrees:    Normal    Muscle Strength   Abduction:            5/5  Internal Rotation:  5/5  External Rotation: " 5/5  Supraspinatus:     5/5  Subscapularis:     5/5  Biceps:                 5/5    Tests   Impingement:   Negative  Lopez:          Negative  Cross Arm:      Negative  Drop Arm:        Negative  Apprehension: Negative  Sulcus:            Positive    Comments:  Pos posterior apprehension, + clicking with post drawer          X-ray images Ordered and independently reviewed by me in the office today with the patient. X-ray shows: concerning for possible posterior glenoid fracture, old.     ASSESSMENT/PLAN    ICD-10-CM    1. Pain in joint of left shoulder M25.512 XR Shoulder Left G/E 3 Views     PHYSICAL THERAPY REFERRAL   2. Shoulder instability, left M25.312 PHYSICAL THERAPY REFERRAL     chronic shoulder instability without specific acute dislocation event, xr suspicious for glenoid fracture chronic, posterior. Will start with PHYSICAL THERAPY, pt opted for [facility] THERAP[facility] near his home.     Recheck 6-12 weeks, depending on progress.     Again, thank you for allowing me to participate in the care of your patient.        Sincerely,        Jose Escobar MD

## 2018-10-09 ENCOUNTER — MYC REFILL (OUTPATIENT)
Dept: FAMILY MEDICINE | Facility: CLINIC | Age: 23
End: 2018-10-09

## 2018-10-09 DIAGNOSIS — F90.0 ATTENTION DEFICIT HYPERACTIVITY DISORDER (ADHD), PREDOMINANTLY INATTENTIVE TYPE: ICD-10-CM

## 2018-10-09 NOTE — TELEPHONE ENCOUNTER
adderall      Last Written Prescription Date:  9/6/18  Last Fill Quantity: 30,   # refills: 0  Last Office Visit: 9/26/18  Future Office visit:       Routing refill request to provider for review/approval because:  Drug not on the FMG, UMP or  Health refill protocol or controlled substance    RX monitoring program ([place]) reviewed:  not reviewed/not due - last done on 9/6/18    [place] profile:  [email or web address]    Lina Savage RN   [facility] - Triage

## 2018-10-09 NOTE — TELEPHONE ENCOUNTER
Message from MyChart:  Original authorizing provider: MD Barrett Love Olivera would like a refill of the following medications:  amphetamine-dextroamphetamine (ADDERALL) 10 MG per tablet [Lorena Davis MD]  amphetamine-dextroamphetamine (ADDERALL XR) 20 MG per 24 hr capsule [Lorena Davis MD]    [place] [facility] [place] - [address] [facility]    Comment:

## 2018-10-11 ENCOUNTER — THERAPY VISIT (OUTPATIENT)
Dept: PHYSICAL THERAPY | Facility: CLINIC | Age: 23
End: 2018-10-11
Payer: COMMERCIAL

## 2018-10-11 DIAGNOSIS — M25.312 SHOULDER INSTABILITY, LEFT: ICD-10-CM

## 2018-10-11 DIAGNOSIS — M25.512 PAIN IN JOINT OF LEFT SHOULDER: ICD-10-CM

## 2018-10-11 PROCEDURE — 97110 THERAPEUTIC EXERCISES: CPT | Mod: GP | Performed by: PHYSICAL THERAPIST

## 2018-10-11 PROCEDURE — 97161 PT EVAL LOW COMPLEX 20 MIN: CPT | Mod: GP | Performed by: PHYSICAL THERAPIST

## 2018-10-11 RX ORDER — DEXTROAMPHETAMINE SACCHARATE, AMPHETAMINE ASPARTATE, DEXTROAMPHETAMINE SULFATE AND AMPHETAMINE SULFATE 2.5; 2.5; 2.5; 2.5 MG/1; MG/1; MG/1; MG/1
10 TABLET ORAL DAILY
Qty: 30 TABLET | Refills: 0 | Status: SHIPPED | OUTPATIENT
Start: 2018-10-11 | End: 2018-11-11

## 2018-10-11 RX ORDER — DEXTROAMPHETAMINE SACCHARATE, AMPHETAMINE ASPARTATE MONOHYDRATE, DEXTROAMPHETAMINE SULFATE AND AMPHETAMINE SULFATE 5; 5; 5; 5 MG/1; MG/1; MG/1; MG/1
20 CAPSULE, EXTENDED RELEASE ORAL DAILY
Qty: 30 CAPSULE | Refills: 0 | Status: SHIPPED | OUTPATIENT
Start: 2018-10-11 | End: 2018-11-11

## 2018-10-11 NOTE — PROGRESS NOTES
[facility] Initial Evaluation  Subjective:  Patient is a 23 year old male presenting with rehab general hpi.   General   Please check all that apply to your current or past medical history:  Overweight and depression  Medical allergies:  Yes (Adhesive tape)  Other surgeries:  Orthopedic surgery (2x scope L knee, 1x right knee)  Medications you are currently taking:  Anti-depressants (Anti anxiety, adhd)  Occupation comment:  None    What are your primary job tasks:  Prolonged sitting (Computer work)                      Objective:  System    Physical Exam    General     ROS    Assessment/Plan:

## 2018-10-11 NOTE — PROGRESS NOTES
[facility] Initial Evaluation  Subjective:  HPI Comments: C/C:  Intermittent left shoulder feeling loose/vulnerable to slipping.  Occurs mainly with lifting weights bench press in 90.  No real pain in shoulder.  Is not limited in daily activities.    Hx:  2013-Playing football and was performing a cut block when he landed on his left elbow jamming his left shoulder.  Stated he noted immediate severe pain.  Became very stiff for a period of time, but then improved.  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  Had an x-ray that is (-), MD felt there may have been evidence of a posterior glenoid fx (see in EPIC).  PMH:  No previous hx of shoulder problems.  Currently cont to lift recreationally.    General health:  Excellent.  Pt just graduated from grad school.      The history is provided by the patient.                       Objective:  Standing Alignment:      Shoulder/UE:  Rounded shoulders                                       Shoulder Evaluation:  ROM:  AROM:                          Extension/Internal Rotation:  Left:  To T10    Right:  To T10    PROM:    Flexion:  Left:  170    Right: 170      Abduction:  Left:  90    Right:  90      External Rotation:  Left:  75    Right:  65                    Strength:        Abduction:  Left:/5 Strong/pain free  Pain:    Right:/5  Strong/pain free    Pain:  Adduction:  Left:/5  Strong/pain free   Pain:    Right:/5  Strong/pain free    Pain:  Internal Rotation:  Left:/5  Strong/pain free    Pain:    Right:/5  Strong/pain free    Pain:  External Rotation:   Left:/5  Strong/pain free    Pain:   Right:/5  Strong/pain free    Pain:    Horizontal Abduction:  Left:/5  Strong/pain free    Pain:    Right:/5  Strong/pain free   Pain:    Elbow Flexion:  Left:/5  Strong/pain free    Pain:    Right:/5  Strong/pain free    Pain:  Elbow Extension:  Left:/5  Strong/pain free    Pain:    Right:/5  Strong/pain free    Pain:  Stability  Testing:    Left shoulder stability positive testing:  Sulcus sign and Load and shift posterior  Left shoulder stability negative testing:  Load and shift anterior    Right shoulder stability negative testing:  Sulcus sign; Load and shift anterior and Load and shift posterior                                         General     ROS    Assessment/Plan:    Patient is a 23 year old male with left side shoulder complaints.    Patient has the following significant findings with corresponding treatment plan.                Diagnosis 1:  Left shoulder instability  Decreased ROM/flexibility - manual therapy and therapeutic exercise  Decreased strength - therapeutic exercise and therapeutic activities  Impaired posture - neuro re-education  Instability -  Therapeutic Activity  Therapeutic Exercise    Therapy Evaluation Codes:   1) History comprised of:   Personal factors that impact the plan of care:      None.    Comorbidity factors that impact the plan of care are:      None.     Medications impacting care: None.  2) Examination of Body Systems comprised of:   Body structures and functions that impact the plan of care:      Shoulder.   Activity limitations that impact the plan of care are:      Lifting.  3) Clinical presentation characteristics are:   Stable/Uncomplicated.  4) Decision-Making    Low complexity using standardized patient assessment instrument and/or measureable assessment of functional outcome.  Cumulative Therapy Evaluation is: Low complexity.    Previous and current functional limitations:  (See Goal Flow Sheet for this information)    Short term and Long term goals: (See Goal Flow Sheet for this information)     Communication ability:  Patient appears to be able to clearly communicate and understand verbal and written communication and follow directions correctly.  Treatment Explanation - The following has been discussed with the patient:   RX ordered/plan of care  Anticipated outcomes  Possible risks and  side effects  This patient would benefit from PT intervention to resume normal activities.   Rehab potential is excellent.    Frequency:  1 X week, once daily  Duration:  for 5-7 visits  Discharge Plan:  Achieve all LTG.  Independent in home treatment program.  Reach maximal therapeutic benefit.    Please refer to the daily flowsheet for treatment today, total treatment time and time spent performing 1:1 timed codes.

## 2018-10-11 NOTE — TELEPHONE ENCOUNTER
Prescriptions for adderall delivered to  EP pharmacy and My Chart message sent to the pt.  Reina Miramontes,

## 2018-10-16 ENCOUNTER — THERAPY VISIT (OUTPATIENT)
Dept: PHYSICAL THERAPY | Facility: CLINIC | Age: 23
End: 2018-10-16
Payer: COMMERCIAL

## 2018-10-16 DIAGNOSIS — M25.312 SHOULDER INSTABILITY, LEFT: ICD-10-CM

## 2018-10-16 PROCEDURE — 97112 NEUROMUSCULAR REEDUCATION: CPT | Mod: GP | Performed by: PHYSICAL THERAPIST

## 2018-10-16 PROCEDURE — 97110 THERAPEUTIC EXERCISES: CPT | Mod: GP | Performed by: PHYSICAL THERAPIST

## 2018-10-30 ENCOUNTER — THERAPY VISIT (OUTPATIENT)
Dept: PHYSICAL THERAPY | Facility: CLINIC | Age: 23
End: 2018-10-30
Payer: COMMERCIAL

## 2018-10-30 DIAGNOSIS — M25.312 SHOULDER INSTABILITY, LEFT: ICD-10-CM

## 2018-10-30 PROCEDURE — 97110 THERAPEUTIC EXERCISES: CPT | Mod: GP | Performed by: PHYSICAL THERAPIST

## 2018-10-30 PROCEDURE — 97112 NEUROMUSCULAR REEDUCATION: CPT | Mod: GP | Performed by: PHYSICAL THERAPIST

## 2018-11-05 ENCOUNTER — MYC REFILL (OUTPATIENT)
Dept: FAMILY MEDICINE | Facility: CLINIC | Age: 23
End: 2018-11-05

## 2018-11-05 DIAGNOSIS — F41.8 ANXIOUS DEPRESSION: ICD-10-CM

## 2018-11-05 NOTE — TELEPHONE ENCOUNTER
"Requested Prescriptions   Pending Prescriptions Disp Refills     sertraline (ZOLOFT) 25 MG tablet  Last Written Prescription Date:  9/13/18  Last Fill Quantity: 90,  # refills: 1   Last office visit: 9/26/2018 with prescribing provider:  Ryan   Future Office Visit:   Next 5 appointments (look out 90 days)     Dec 10, 2018 10:00 AM CST   Return Visit with Betty Chavez   [facility] ([facility])    [address]  [facility] [address]   [phone]                  90 tablet 1     Sig: Take 1 tablet (25 mg) by mouth daily    SSRIs Protocol Passed    11/5/2018 12:49 PM       Passed - Recent (12 mo) or future (30 days) visit within the authorizing provider's specialty    Patient had office visit in the last 12 months or has a visit in the next 30 days with authorizing provider or within the authorizing provider's specialty.  See \"Patient Info\" tab in inbasket, or \"Choose Columns\" in Meds & Orders section of the refill encounter.             Passed - Medication is Bupropion    If the medication is Bupropion (Wellbutrin), and the patient is taking for smoking cessation; OK to refill.         Passed - Patient is age 18 or older        buPROPion (WELLBUTRIN XL) 150 MG 24 hr tablet  Last Written Prescription Date:  9/13/18  Last Fill Quantity: 90,  # refills: 1   Last office visit: 9/26/2018 with prescribing provider:  Ryan   Future Office Visit:   Next 5 appointments (look out 90 days)     Dec 10, 2018 10:00 AM CST   Return Visit with Betty [facility] ([facility])    [address] [place] [address] [address]  [facility] [address]   [phone]                  90 tablet 1     Sig: Take 1 tablet (150 mg) by mouth every morning    SSRIs Protocol Passed    11/5/2018 12:49 PM       Passed - Recent (12 mo) or future (30 days) visit within the authorizing provider's specialty    Patient had office visit in the last 12 months or has a visit in the next 30 days with authorizing " "provider or within the authorizing provider's specialty.  See \"Patient Info\" tab in inbasket, or \"Choose Columns\" in Meds & Orders section of the refill encounter.             Passed - Medication is Bupropion    If the medication is Bupropion (Wellbutrin), and the patient is taking for smoking cessation; OK to refill.         Passed - Patient is age 18 or older          "

## 2018-11-05 NOTE — TELEPHONE ENCOUNTER
Message from MyChart:  Original authorizing provider: Lorena Davis MD    Barrett Olivera would like a refill of the following medications:  sertraline (ZOLOFT) 25 MG tablet [Lorena Davis MD]  buPROPion (WELLBUTRIN XL) 150 MG 24 hr tablet [Lorena Davis MD]    Preferred pharmacy: [facility] [place] [address] [facility] [address]    Comment:

## 2018-11-06 RX ORDER — BUPROPION HYDROCHLORIDE 150 MG/1
150 TABLET ORAL EVERY MORNING
Qty: 90 TABLET | Refills: 1 | OUTPATIENT
Start: 2018-11-06

## 2018-11-06 RX ORDER — SERTRALINE HYDROCHLORIDE 25 MG/1
25 TABLET, FILM COATED ORAL DAILY
Qty: 90 TABLET | Refills: 1 | OUTPATIENT
Start: 2018-11-06

## 2018-11-11 ENCOUNTER — MYC REFILL (OUTPATIENT)
Dept: FAMILY MEDICINE | Facility: CLINIC | Age: 23
End: 2018-11-11

## 2018-11-11 DIAGNOSIS — F90.0 ATTENTION DEFICIT HYPERACTIVITY DISORDER (ADHD), PREDOMINANTLY INATTENTIVE TYPE: ICD-10-CM

## 2018-11-12 RX ORDER — DEXTROAMPHETAMINE SACCHARATE, AMPHETAMINE ASPARTATE, DEXTROAMPHETAMINE SULFATE AND AMPHETAMINE SULFATE 2.5; 2.5; 2.5; 2.5 MG/1; MG/1; MG/1; MG/1
10 TABLET ORAL DAILY
Qty: 30 TABLET | Refills: 0 | Status: SHIPPED | OUTPATIENT
Start: 2018-11-12 | End: 2018-12-12

## 2018-11-12 RX ORDER — DEXTROAMPHETAMINE SACCHARATE, AMPHETAMINE ASPARTATE MONOHYDRATE, DEXTROAMPHETAMINE SULFATE AND AMPHETAMINE SULFATE 5; 5; 5; 5 MG/1; MG/1; MG/1; MG/1
20 CAPSULE, EXTENDED RELEASE ORAL DAILY
Qty: 30 CAPSULE | Refills: 0 | Status: SHIPPED | OUTPATIENT
Start: 2018-11-12 | End: 2018-12-12

## 2018-11-12 NOTE — TELEPHONE ENCOUNTER
Adderall XR 20mg      Last Written Prescription Date:  10/11/18  Last Fill Quantity: 30,   # refills: 0  Last Office Visit: 9/26/18  Future Office visit:    Next 5 appointments (look out 90 days)     Dec 10, 2018 10:00 AM CST   Return Visit with [facility] ([facility])    [address] [place] [address] [address]  [facility] [address]   [phone]                   Routing refill request to provider for review/approval because:  Drug not on the FMG, [facility]MP or [facility] refill protocol or controlled substance    Adderall 10mg      Last Written Prescription Date:  10/11/18  Last Fill Quantity: 30,   # refills: 0  Last Office Visit: 9/26/18  Future Office visit:    Next 5 appointments (look out 90 days)     Dec 10, 2018 10:00 AM CST   Return Visit with [facility] ([facility])    [address] [place] [address] [address]  [facility] [address]   [phone]                   Routing refill request to provider for review/approval because:  Drug not on the FMG, [facility]MP or [facility] refill protocol or controlled substance

## 2018-11-12 NOTE — TELEPHONE ENCOUNTER
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 Davis would like to see me, sooner is better.

## 2018-11-12 NOTE — TELEPHONE ENCOUNTER
RX monitoring program ([place]) reviewed:  not reviewed/not due - last done on 09/06/2018    [place] profile:  [email or web address]

## 2018-11-13 NOTE — TELEPHONE ENCOUNTER
Prescriptions for adderall delivered to  EP pharmacy. My Chart message sent to the pt.  Reina Miramontes,

## 2018-12-03 ENCOUNTER — OFFICE VISIT (OUTPATIENT)
Dept: PSYCHOLOGY | Facility: CLINIC | Age: 23
End: 2018-12-03
Attending: FAMILY MEDICINE
Payer: COMMERCIAL

## 2018-12-03 DIAGNOSIS — F41.1 GAD (GENERALIZED ANXIETY DISORDER): ICD-10-CM

## 2018-12-03 DIAGNOSIS — F33.1 MDD (MAJOR DEPRESSIVE DISORDER), RECURRENT EPISODE, MODERATE (H): ICD-10-CM

## 2018-12-03 PROCEDURE — 90834 PSYTX W PT 45 MINUTES: CPT | Performed by: SOCIAL WORKER

## 2018-12-03 ASSESSMENT — ANXIETY QUESTIONNAIRES
GAD7 TOTAL SCORE: 11
2. NOT BEING ABLE TO STOP OR CONTROL WORRYING: MORE THAN HALF THE DAYS
5. BEING SO RESTLESS THAT IT IS HARD TO SIT STILL: SEVERAL DAYS
3. WORRYING TOO MUCH ABOUT DIFFERENT THINGS: MORE THAN HALF THE DAYS
6. BECOMING EASILY ANNOYED OR IRRITABLE: SEVERAL DAYS
7. FEELING AFRAID AS IF SOMETHING AWFUL MIGHT HAPPEN: SEVERAL DAYS
IF YOU CHECKED OFF ANY PROBLEMS ON THIS QUESTIONNAIRE, HOW DIFFICULT HAVE THESE PROBLEMS MADE IT FOR YOU TO DO YOUR WORK, TAKE CARE OF THINGS AT HOME, OR GET ALONG WITH OTHER PEOPLE: SOMEWHAT DIFFICULT
1. FEELING NERVOUS, ANXIOUS, OR ON EDGE: MORE THAN HALF THE DAYS

## 2018-12-03 ASSESSMENT — PATIENT HEALTH QUESTIONNAIRE - PHQ9
5. POOR APPETITE OR OVEREATING: MORE THAN HALF THE DAYS
SUM OF ALL RESPONSES TO PHQ QUESTIONS 1-9: 12

## 2018-12-03 NOTE — Clinical Note
Dr. Davis,   This patient you see for primary care services has begun individual psychotherapy with me. Please let me know if I can assist in their care in any way.  HADLEY Guzman, LGSW

## 2018-12-04 ASSESSMENT — ANXIETY QUESTIONNAIRES: GAD7 TOTAL SCORE: 11

## 2018-12-08 PROBLEM — F41.1 GAD (GENERALIZED ANXIETY DISORDER): Status: ACTIVE | Noted: 2018-12-08

## 2018-12-08 PROBLEM — F33.1 MDD (MAJOR DEPRESSIVE DISORDER), RECURRENT EPISODE, MODERATE (H): Status: ACTIVE | Noted: 2018-12-08

## 2018-12-08 NOTE — PROGRESS NOTES
Progress Note - Initial Session    Client Name:  Barrett Olivera Date: 12/3/18         Service Type: Individual      Session Start Time: 1pm  Session End Time: 1:45pm      Session Length: 38 - 52      Session #: 1     Attendees: Client attended alone         Diagnostic Assessment in progress.  Unable to complete documentation at the conclusion of the first session due to needing additional time to gather complete history and information. Client and therapist discussed his family background and history of mental health concerns. Discussed tense relationship dynamics and discussed how therapy can help client manage his symptoms and learn new skills to improve his mood and manage conflict.      Mental Status Assessment:  Appearance:   Appropriate   Eye Contact:   Good   Psychomotor Behavior: Normal   Attitude:   Cooperative   Orientation:   All  Speech   Rate / Production: Normal    Volume:  Normal   Mood:    Angry  Sad   Affect:    congruent to mood   Thought Content:  Clear   Thought Form:  Coherent  Logical   Insight:    Good       Safety Issues and Plan for Safety and Risk Management:  Client denies current fears or concerns for personal safety.  Client denies current or recent suicidal ideation or behaviors.  Client denies current or recent homicidal ideation or behaviors.  Client denies current or recent self injurious behavior or ideation.  Client denies other safety concerns.  A safety and risk management plan has not been developed at this time, however client was given the after-hours number / 911 should there be a change in any of these risk factors.  Client reports there are firearms in the house. The firearms are secured in a locked space.      Diagnostic Criteria:  A. Excessive anxiety and worry about a number of events or activities (such as work or school performance).   B. The person finds it difficult to control the worry.  C. Select 3 or more symptoms (required for diagnosis). Only  one item is required in children.   - Restlessness or feeling keyed up or on edge.    - Being easily fatigued.    - Difficulty concentrating or mind going blank.    - Irritability.    - Muscle tension.    - Sleep disturbance (difficulty falling or staying asleep, or restless unsatisfying sleep).   D. The focus of the anxiety and worry is not confined to features of an Axis I disorder.  E. The anxiety, worry, or physical symptoms cause clinically significant distress or impairment in social, occupational, or other important areas of functioning.   F. The disturbance is not due to the direct physiological effects of a substance (e.g., a drug of abuse, a medication) or a general medical condition (e.g., hyperthyroidism) and does not occur exclusively during a Mood Disorder, a Psychotic Disorder, or a Pervasive Developmental Disorder.  A) Recurrent episode(s) - symptoms have been present during the same 2-week period and represent a change from previous functioning 5 or more symptoms (required for diagnosis)   - Depressed mood. Note: In children and adolescents, can be irritable mood.     - Diminished interest or pleasure in all, or almost all, activities.    - Decreased sleep.    - Psychomotor activity retardation.    - Fatigue or loss of energy.    - Feelings of worthlessness or inappropriate and excessive guilt.    - Diminished ability to think or concentrate, or indecisiveness.   B) The symptoms cause clinically significant distress or impairment in social, occupational, or other important areas of functioning  C) The episode is not attributable to the physiological effects of a substance or to another medical condition  D) The occurence of major depressive episode is not better explained by other thought / psychotic disorders  E) There has never been a manic episode or hypomanic episode        DSM5 Diagnoses: (Sustained by DSM5 Criteria Listed Above)  Diagnoses: 296.32 (F33.1) Major Depressive Disorder, Recurrent  Episode, Moderate _  300.02 (F41.1) Generalized Anxiety Disorder  Psychosocial & Contextual Factors: Client reports history of family stress and currently is looking for work in a niche field.   WHODAS 2.0 (12 item)            This questionnaire asks about difficulties due to health conditions. Health conditions  include  disease or illnesses, other health problems that may be short or long lasting,  injuries, mental health or emotional problems, and problems with alcohol or drugs.                     Think back over the past 30 days and answer these questions, thinking about how much  difficulty you had doing the following activities. For each question, please [place] only  one response.    S1 Standing for long periods such as 30 minutes? Moderate =   3   S2 Taking care of household responsibilities? Severe =       4   S3 Learning a new task, for example, learning how to get to a new place? None =         1   S4 How much of a problem do you have joining community activities (for example, festivals, Taoism or other activities) in the same way as anyone else can? Mild =           2   S5 How much have you been emotionally affected by your health problems? Moderate =   3     In the past 30 days, how much difficulty did you have in:   S6 Concentrating on doing something for ten minutes? Moderate =   3   S7 Walking a long distance such as a kilometer (or equivalent)? None =         1   S8 Washing your whole body? None =         1   S9 Getting dressed? None =         1   S10 Dealing with people you do not know? None =         1   S11 Maintaining a friendship? None =         1   S12 Your day to day work? Moderate =   3     H1 Overall, in the past 30 days, how many days were these difficulties present? Record number of days 20   H2 In the past 30 days, for how many days were you totally unable to carry out your usual activities or work because of any health condition? Record number of days  0   H3 In the past 30 days, not  counting the days that you were totally unable, for how many days did you cut back or reduce your usual activities or work because of any health condition? Record number of days 5       Collateral Reports Completed:  Routed note to PCP      PLAN: (Homework, other):  Client stated that he may follow up for ongoing services with [facility].       HADLEY Guzman, SW   12/8/18  Note reviewed and clinical supervision by HADLEY De La Fuente [facility] 12/10/2018

## 2018-12-10 ENCOUNTER — OFFICE VISIT (OUTPATIENT)
Dept: PSYCHOLOGY | Facility: CLINIC | Age: 23
End: 2018-12-10
Attending: FAMILY MEDICINE
Payer: COMMERCIAL

## 2018-12-10 DIAGNOSIS — F33.1 MDD (MAJOR DEPRESSIVE DISORDER), RECURRENT EPISODE, MODERATE (H): Primary | ICD-10-CM

## 2018-12-10 DIAGNOSIS — F41.1 GAD (GENERALIZED ANXIETY DISORDER): ICD-10-CM

## 2018-12-10 PROCEDURE — 90791 PSYCH DIAGNOSTIC EVALUATION: CPT | Performed by: SOCIAL WORKER

## 2018-12-10 ASSESSMENT — PATIENT HEALTH QUESTIONNAIRE - PHQ9
5. POOR APPETITE OR OVEREATING: SEVERAL DAYS
SUM OF ALL RESPONSES TO PHQ QUESTIONS 1-9: 12

## 2018-12-10 ASSESSMENT — ANXIETY QUESTIONNAIRES
2. NOT BEING ABLE TO STOP OR CONTROL WORRYING: SEVERAL DAYS
3. WORRYING TOO MUCH ABOUT DIFFERENT THINGS: SEVERAL DAYS
6. BECOMING EASILY ANNOYED OR IRRITABLE: NOT AT ALL
5. BEING SO RESTLESS THAT IT IS HARD TO SIT STILL: SEVERAL DAYS
GAD7 TOTAL SCORE: 6
IF YOU CHECKED OFF ANY PROBLEMS ON THIS QUESTIONNAIRE, HOW DIFFICULT HAVE THESE PROBLEMS MADE IT FOR YOU TO DO YOUR WORK, TAKE CARE OF THINGS AT HOME, OR GET ALONG WITH OTHER PEOPLE: SOMEWHAT DIFFICULT
7. FEELING AFRAID AS IF SOMETHING AWFUL MIGHT HAPPEN: SEVERAL DAYS
1. FEELING NERVOUS, ANXIOUS, OR ON EDGE: SEVERAL DAYS
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
## 2018-12-11 ASSESSMENT — ANXIETY QUESTIONNAIRES: GAD7 TOTAL SCORE: 6

## 2018-12-12 ENCOUNTER — MYC REFILL (OUTPATIENT)
Dept: FAMILY MEDICINE | Facility: CLINIC | Age: 23
End: 2018-12-12

## 2018-12-12 DIAGNOSIS — F90.0 ATTENTION DEFICIT HYPERACTIVITY DISORDER (ADHD), PREDOMINANTLY INATTENTIVE TYPE: ICD-10-CM

## 2018-12-12 DIAGNOSIS — F41.8 ANXIOUS DEPRESSION: ICD-10-CM

## 2018-12-13 RX ORDER — DEXTROAMPHETAMINE SACCHARATE, AMPHETAMINE ASPARTATE MONOHYDRATE, DEXTROAMPHETAMINE SULFATE AND AMPHETAMINE SULFATE 5; 5; 5; 5 MG/1; MG/1; MG/1; MG/1
20 CAPSULE, EXTENDED RELEASE ORAL DAILY
Qty: 30 CAPSULE | Refills: 0 | Status: SHIPPED | OUTPATIENT
Start: 2018-12-13 | End: 2019-01-14

## 2018-12-13 RX ORDER — SERTRALINE HYDROCHLORIDE 25 MG/1
25 TABLET, FILM COATED ORAL DAILY
Qty: 90 TABLET | Refills: 1 | OUTPATIENT
Start: 2018-12-13

## 2018-12-13 RX ORDER — DEXTROAMPHETAMINE SACCHARATE, AMPHETAMINE ASPARTATE, DEXTROAMPHETAMINE SULFATE AND AMPHETAMINE SULFATE 2.5; 2.5; 2.5; 2.5 MG/1; MG/1; MG/1; MG/1
10 TABLET ORAL DAILY
Qty: 30 TABLET | Refills: 0 | Status: SHIPPED | OUTPATIENT
Start: 2018-12-13 | End: 2019-01-14

## 2018-12-13 RX ORDER — BUPROPION HYDROCHLORIDE 150 MG/1
150 TABLET ORAL EVERY MORNING
Qty: 90 TABLET | Refills: 1 | OUTPATIENT
Start: 2018-12-13

## 2018-12-13 NOTE — TELEPHONE ENCOUNTER
Ok  refill faxed. Remind pt to do follow up for med check before next refill, since he is due.

## 2018-12-13 NOTE — TELEPHONE ENCOUNTER
"Has refill on sertraline and Wellbutrin- routing others refills to pcp.    Whit JacksonRN BSN  [facility]  [phone]       Requested Prescriptions   Pending Prescriptions Disp Refills     sertraline (ZOLOFT) 25 MG tablet 90 tablet 1    Last Written Prescription Date:  09/13/18  Last Fill Quantity: 90,  # refills: 1   Last office visit: 9/26/2018 with prescribing provider:     Future Office Visit:   Next 5 appointments (look out 90 days)    Dec 21, 2018  1:30 PM CST  Return Visit with [facility] ([facility]) [address] [place] [address] [address]  [place] [address] [phone]   Jan 03, 2019  1:00 PM CST  Return Visit with [facility] ([facility]) [address]  [place] [address] [phone]          Sig: Take 1 tablet (25 mg) by mouth daily    SSRIs Protocol Passed - 12/12/2018  3:24 PM       Passed - Recent (12 mo) or future (30 days) visit within the authorizing provider's specialty    Patient had office visit in the last 12 months or has a visit in the next 30 days with authorizing provider or within the authorizing provider's specialty.  See \"Patient Info\" tab in inbasket, or \"Choose Columns\" in Meds & Orders section of the refill encounter.             Passed - Medication is Bupropion    If the medication is Bupropion (Wellbutrin), and the patient is taking for smoking cessation; OK to refill.         Passed - Patient is age 18 or older        buPROPion (WELLBUTRIN XL) 150 MG 24 hr tablet 90 tablet 1    Last Written Prescription Date:  09/13/18  Last Fill Quantity: 90,  # refills: 1   Last office visit: 9/26/2018 with prescribing provider:     Future Office Visit:   Next 5 appointments (look out 90 days)    Dec 21, 2018  1:30 PM CST  Return Visit with [facility] [place] ([facility] [place]) [address] [place] [address] [address]  [place] [id number] [phone]   Jan 03, 2019  1:00 PM CST  Return " "Visit with [facility] [place] ([facility] [place]) [address] [place] [address] [address]  [place] [address]  [phone]          Sig: Take 1 tablet (150 mg) by mouth every morning    SSRIs Protocol Passed - 12/12/2018  3:24 PM       Passed - Recent (12 mo) or future (30 days) visit within the authorizing provider's specialty    Patient had office visit in the last 12 months or has a visit in the next 30 days with authorizing provider or within the authorizing provider's specialty.  See \"Patient Info\" tab in inbasket, or \"Choose Columns\" in Meds & Orders section of the refill encounter.             Passed - Medication is Bupropion    If the medication is Bupropion (Wellbutrin), and the patient is taking for smoking cessation; OK to refill.         Passed - Patient is age 18 or older        amphetamine-dextroamphetamine (ADDERALL) 10 MG tablet 30 tablet 0    Last Written Prescription Date:  11/12/18  Last Fill Quantity: 30,  # refills: 0   Last office visit: 9/26/2018 with prescribing provider:     Future Office Visit:   Next 5 appointments (look out 90 days)    Dec 21, 2018  1:30 PM CST  Return Visit with [facility] [place] ([facility] [place]) [address] [place] [address]  [place] [address]  [phone]   Jan 03, 2019  1:00 PM CST  Return Visit with [facility] [place] ([facility] [place]) [address] [place] [address]  [place] [address]  [phone]          Sig: Take 1 tablet (10 mg) by mouth daily    There is no refill protocol information for this order        amphetamine-dextroamphetamine (ADDERALL XR) 20 MG 24 hr capsule  Last Written Prescription Date:  11/12/18  Last Fill Quantity: 30,  # refills: 0   Last office visit: 9/26/2018 with prescribing provider:     Future Office Visit:   Next 5 appointments (look out 90 days)    Dec 21, 2018  1:30 PM CST  Return Visit with [facility] ([facility] [place]) [address] [place] [address] " [address]  [place] [address]  [phone]   Jan 03, 2019  1:00 PM CST  Return Visit with Betty Chavez  [facility] [place] ([facility] [place]) [address] [facility] [address]  [place] [address]  [phone]          30 capsule 0     Sig: Take 1 capsule (20 mg) by mouth daily    There is no refill protocol information for this order

## 2018-12-14 NOTE — TELEPHONE ENCOUNTER
Prescriptions for adderall fdelivered to  EP pharmacy and My Chart message sent to the pt.  Reina Miramontes,

## 2019-01-14 ENCOUNTER — OFFICE VISIT (OUTPATIENT)
Dept: FAMILY MEDICINE | Facility: CLINIC | Age: 24
End: 2019-01-14
Payer: COMMERCIAL

## 2019-01-14 VITALS
OXYGEN SATURATION: 100 % | SYSTOLIC BLOOD PRESSURE: 126 MMHG | TEMPERATURE: 98.1 F | HEIGHT: 74 IN | DIASTOLIC BLOOD PRESSURE: 82 MMHG | WEIGHT: 315 LBS | BODY MASS INDEX: 40.43 KG/M2 | HEART RATE: 94 BPM

## 2019-01-14 DIAGNOSIS — R03.0 ELEVATED BLOOD PRESSURE READING WITHOUT DIAGNOSIS OF HYPERTENSION: ICD-10-CM

## 2019-01-14 DIAGNOSIS — F90.0 ATTENTION DEFICIT HYPERACTIVITY DISORDER (ADHD), PREDOMINANTLY INATTENTIVE TYPE: ICD-10-CM

## 2019-01-14 DIAGNOSIS — F41.8 ANXIOUS DEPRESSION: ICD-10-CM

## 2019-01-14 PROCEDURE — 99214 OFFICE O/P EST MOD 30 MIN: CPT | Performed by: FAMILY MEDICINE

## 2019-01-14 RX ORDER — BUPROPION HYDROCHLORIDE 150 MG/1
150 TABLET ORAL EVERY MORNING
Qty: 90 TABLET | Refills: 1 | Status: SHIPPED | OUTPATIENT
Start: 2019-01-14

## 2019-01-14 RX ORDER — DEXTROAMPHETAMINE SACCHARATE, AMPHETAMINE ASPARTATE, DEXTROAMPHETAMINE SULFATE AND AMPHETAMINE SULFATE 2.5; 2.5; 2.5; 2.5 MG/1; MG/1; MG/1; MG/1
10 TABLET ORAL DAILY
Qty: 30 TABLET | Refills: 0 | Status: SHIPPED | OUTPATIENT
Start: 2019-01-14 | End: 2019-02-11

## 2019-01-14 RX ORDER — SERTRALINE HYDROCHLORIDE 25 MG/1
25 TABLET, FILM COATED ORAL DAILY
Qty: 90 TABLET | Refills: 1 | Status: SHIPPED | OUTPATIENT
Start: 2019-01-14

## 2019-01-14 RX ORDER — DEXTROAMPHETAMINE SACCHARATE, AMPHETAMINE ASPARTATE MONOHYDRATE, DEXTROAMPHETAMINE SULFATE AND AMPHETAMINE SULFATE 5; 5; 5; 5 MG/1; MG/1; MG/1; MG/1
20 CAPSULE, EXTENDED RELEASE ORAL DAILY
Qty: 30 CAPSULE | Refills: 0 | Status: SHIPPED | OUTPATIENT
Start: 2019-01-14 | End: 2019-02-11

## 2019-01-14 ASSESSMENT — ANXIETY QUESTIONNAIRES
7. FEELING AFRAID AS IF SOMETHING AWFUL MIGHT HAPPEN: SEVERAL DAYS
3. WORRYING TOO MUCH ABOUT DIFFERENT THINGS: SEVERAL DAYS
GAD7 TOTAL SCORE: 4
IF YOU CHECKED OFF ANY PROBLEMS ON THIS QUESTIONNAIRE, HOW DIFFICULT HAVE THESE PROBLEMS MADE IT FOR YOU TO DO YOUR WORK, TAKE CARE OF THINGS AT HOME, OR GET ALONG WITH OTHER PEOPLE: NOT DIFFICULT AT ALL
5. BEING SO RESTLESS THAT IT IS HARD TO SIT STILL: NOT AT ALL
6. BECOMING EASILY ANNOYED OR IRRITABLE: SEVERAL DAYS
2. NOT BEING ABLE TO STOP OR CONTROL WORRYING: NOT AT ALL
1. FEELING NERVOUS, ANXIOUS, OR ON EDGE: SEVERAL DAYS

## 2019-01-14 ASSESSMENT — PATIENT HEALTH QUESTIONNAIRE - PHQ9
SUM OF ALL RESPONSES TO PHQ QUESTIONS 1-9: 4
5. POOR APPETITE OR OVEREATING: NOT AT ALL

## 2019-01-14 ASSESSMENT — MIFFLIN-ST. JEOR: SCORE: 2560.69

## 2019-01-14 NOTE — PROGRESS NOTES
SUBJECTIVE:   Barrett Olivera is a 23 year old male who presents to clinic today for the following health issues:      Medication Followup of  Adderall     Taking Medication as prescribed: yes    Side Effects:  None    Medication Helping Symptoms:  yes       PROBLEMS TO ADD ON...  Depression and Anxiety Follow-Up    Status since last visit: No change, he is doing better on med's and has bene out of mds for few weeks now so may beslightly anxious he was on vaction so it was nottoo bad but nw back t works     Other associated symptoms:None    Complicating factors:     Significant life event: Yes-  Grand father was diagnosed with leukemia and I hospice so has bee stressful      Current substance abuse: None    PHQ 9/13/2018 12/3/2018 12/10/2018   PHQ-9 Total Score 3 12 12   Q9: Suicide Ideation Not at all Not at all Not at all     REBECCA-7 SCORE 9/13/2018 12/3/2018 12/10/2018   Total Score 4 11 6       PHQ-9  English  PHQ-9   Any Language  REBECCA-7  Suicide Assessment Five-step Evaluation and Treatment (SAFE-T)    Problem list and histories reviewed & adjusted, as indicated.  Additional history: as documented    Patient Active Problem List   Diagnosis     Attention deficit hyperactivity disorder (ADHD), predominantly inattentive type     Anxious depression     Elevated blood pressure reading without diagnosis of hypertension     BMI 40.0-44.9, adult (H)     Obesity, morbid, BMI 40.0-49.9 (H)     Panic attack     Controlled substance agreement signed     Pain in joint of left shoulder     Shoulder instability, left     MDD (major depressive disorder), recurrent episode, moderate (H)     REBECCA (generalized anxiety disorder)     Past Surgical History:   Procedure Laterality Date     KNEE SURGERY      X2 ON RT, ONE ON LEFT, INJURY RELATED ,        Social History     Tobacco Use     Smoking status: Never Smoker     Smokeless tobacco: Never Used   Substance Use Topics     Alcohol use: Yes     Family History   Problem Relation Age of  "Onset     Breast Cancer Mother      Breast Cancer Maternal Grandmother      Thyroid Disease Other         paternal aunt low on thyroid      Diabetes No family hx of      Coronary Artery Disease No family hx of      Hypertension No family hx of      Hyperlipidemia No family hx of      Prostate Cancer No family hx of      Colon Cancer No family hx of            Reviewed and updated as needed this visit by clinical staff       Reviewed and updated as needed this visit by Provider         ROS:  Constitutional, HEENT, cardiovascular, pulmonary, GI, , musculoskeletal, neuro, skin, endocrine and psych systems are negative, except as otherwise noted.    OBJECTIVE:     /84   Pulse 94   Temp 98.1  F (36.7  C) (Tympanic)   Ht 1.88 m (6' 2\")   Wt (!) 150.6 kg (332 lb)   SpO2 100%   BMI 42.63 kg/m    Body mass index is 42.63 kg/m .  GENERAL: healthy, alert and no distress  EYES: Eyes grossly normal to inspection, PERRL and conjunctivae and sclerae normal  HENT: ear canals and TM's normal, nose and mouth without ulcers or lesions  NECK: no adenopathy, no asymmetry, masses, or scars and thyroid normal to palpation  RESP: lungs clear to auscultation - no rales, rhonchi or wheezes  CV: regular rate and rhythm, normal S1 S2, no S3 or S4, no murmur  ABDOMEN: soft, nontender, no hepatosplenomegaly, no masses and bowel sounds normal  PSYCH: mentation appears normal, affect normal/bright, speech pressured, judgement and insight intact and appearance well groomed        ASSESSMENT/PLAN:     (F41.8) Anxious depression  Comment:   Plan: buPROPion (WELLBUTRIN XL) 150 MG 24 hr tablet,         sertraline (ZOLOFT) 25 MG tablet            (F90.0) Attention deficit hyperactivity disorder (ADHD), predominantly inattentive type  Comment:   Plan: amphetamine-dextroamphetamine (ADDERALL XR) 20         MG 24 hr capsule, amphetamine-dextroamphetamine        (ADDERALL) 10 MG tablet            (Z68.41) BMI 40.0-44.9, adult (H)  (primary " encounter diagnosis)  Comment:   Plan: Healthy diet and exercise reviewed.  Limit pop and juice intake.  Risks of obesity discussed.  Encourage exercise.        (R03.0) Elevated blood pressure reading without diagnosis of hypertension  Comment: he has on and off higher readings   Plan: second BP in adequate control. Discussed cares, low fat low salt diet etc.  encouraged home BP monitoring. Follow up if problem.         Discussed cares, talked about signs and symptoms of anxiety/ depression and treatment options. Willing to continue wih med's . Pros/ cons of med's discussed . encouraged to see  to help and referral given . spent sometimes counseling patient. Follow up in 4 -6  months  months, sooner if problem.         Lorena Davis MD  [facility]

## 2019-01-14 NOTE — PATIENT INSTRUCTIONS
Take medications as directed.  Cares and treatment  discussed   Follow up if problem or concern

## 2019-01-15 ASSESSMENT — ANXIETY QUESTIONNAIRES: GAD7 TOTAL SCORE: 4

## 2019-02-11 ENCOUNTER — MYC REFILL (OUTPATIENT)
Dept: FAMILY MEDICINE | Facility: CLINIC | Age: 24
End: 2019-02-11

## 2019-02-11 DIAGNOSIS — F90.0 ATTENTION DEFICIT HYPERACTIVITY DISORDER (ADHD), PREDOMINANTLY INATTENTIVE TYPE: ICD-10-CM

## 2019-02-11 NOTE — TELEPHONE ENCOUNTER
Requested Prescriptions   Pending Prescriptions Disp Refills     amphetamine-dextroamphetamine (ADDERALL XR) 20 MG 24 hr capsule 30 capsule 0     Sig: Take 1 capsule (20 mg) by mouth daily    There is no refill protocol information for this order        amphetamine-dextroamphetamine (ADDERALL) 10 MG tablet 30 tablet 0     Sig: Take 1 tablet (10 mg) by mouth daily    There is no refill protocol information for this order        amphetamine-dextroamphetamine (ADDERALL) 10 MG tablet 30 tablet 0 1/14/2019       Last Written Prescription Date:  01/14/2019  Last Fill Quantity: 30,  # refills: 0   Last office visit: 1/14/2019 with prescribing provider:  Dr. Ryan Ugarte Office Visit:  Unknown     amphetamine-dextroamphetamine (ADDERALL XR) 20 MG 24 hr capsule 30 capsule 0 1/14/2019         Last Written Prescription Date:  01/14/2019  Last Fill Quantity: 30,  # refills: 0   Last office visit: 1/14/2019 with prescribing provider:  Dr. Ryan Ugarte Office Visit:  Unknown

## 2019-02-12 NOTE — TELEPHONE ENCOUNTER
[place] reviewed:  reviewed- no concerns on 2/11/19    [place]:  [email or web address]    Routing refill request to provider for review/approval because:  Drug not on the [facility] refill protocol     Yaz STONE RN  EP Triage

## 2019-02-14 ENCOUNTER — MYC MEDICAL ADVICE (OUTPATIENT)
Dept: FAMILY MEDICINE | Facility: CLINIC | Age: 24
End: 2019-02-14

## 2019-02-14 RX ORDER — DEXTROAMPHETAMINE SACCHARATE, AMPHETAMINE ASPARTATE MONOHYDRATE, DEXTROAMPHETAMINE SULFATE AND AMPHETAMINE SULFATE 5; 5; 5; 5 MG/1; MG/1; MG/1; MG/1
20 CAPSULE, EXTENDED RELEASE ORAL DAILY
Qty: 30 CAPSULE | Refills: 0 | Status: SHIPPED | OUTPATIENT
Start: 2019-02-14 | End: 2019-03-22

## 2019-02-14 RX ORDER — DEXTROAMPHETAMINE SACCHARATE, AMPHETAMINE ASPARTATE, DEXTROAMPHETAMINE SULFATE AND AMPHETAMINE SULFATE 2.5; 2.5; 2.5; 2.5 MG/1; MG/1; MG/1; MG/1
10 TABLET ORAL DAILY
Qty: 30 TABLET | Refills: 0 | Status: SHIPPED | OUTPATIENT
Start: 2019-02-14 | End: 2019-03-22

## 2019-02-14 NOTE — TELEPHONE ENCOUNTER
RX for Adderall and Adderall XR signed by Dr. Davis. Patient called and left a . Adderall (2) prescriptions have been brought to the  for patient to .      .Louise FONTENOT    [facility]

## 2019-02-15 NOTE — TELEPHONE ENCOUNTER
Called patient and advised that we cannot mail prescriptions to patients homes we can only mail them to pharmacies. Left a  with direct line for patient to return call.        .Louise FONTENOT    [facility] [place]

## 2019-02-18 NOTE — TELEPHONE ENCOUNTER
Please see [employer] message: Please mail the prescriptions to the [facility] in [place]. I called Friday around 5 pm and was told by a team member the same thing and they said they would be mailed as soon as possible.     Routing to team 1 to mail prescription. Thank you.     Charlotte MUÑIZN, RN   [facility]

## 2019-02-18 NOTE — TELEPHONE ENCOUNTER
Patient has sent new [employer] message: Can I have one month filled that I'll  today and then have the other ones mailed? It's been about 1 week since I requested.     Routing to ec team 1. Thank you    Charlotte CASPER, RN   [facility]

## 2019-02-22 NOTE — TELEPHONE ENCOUNTER
TC  envelope from  per patient request and mailed to pharmacy, patient was called and notified      .Louise FONTENOT    [facility]

## 2019-02-22 NOTE — TELEPHONE ENCOUNTER
TC picked up envelope from  and will mail to patients pharmacy.        .Louise FONTENOT    [facility]

## 2019-02-22 NOTE — TELEPHONE ENCOUNTER
Patient sated that he last saw Dr. Davis 1/14/2019 talk to Dr. Davis about getting his Adder[place] [address]. He also spoke with her about getting three sent there so he has them on file. Patient was only given a 30 day supply for this month and wondering if Dr. Davis would be able to write them and mail to pharmacy.   Routing to PCP for further review/recommendations/orders.    Patient would like to be called if she is able to so he is aware.       Routing to team 1: Patient would like current prescription that is in the  mailed to [place] [address] [place] [address]. Thank you      Charlotte MUÑIZN, RN   [facility]

## 2019-02-26 NOTE — TELEPHONE ENCOUNTER
Dr. Davis, we can mail controlled substances to pharmacy only (not patients home). Are you willing to give him a 90 day supply as requested?    Lina Savage RN   [facility] - Triage

## 2019-02-26 NOTE — TELEPHONE ENCOUNTER
We can mail it as needed for him, but he cannot qualify for 3 month script yet as his BP remains out of control.   so he should really schedule for office visit to follow up to further evaluate for his BP as well

## 2019-02-26 NOTE — TELEPHONE ENCOUNTER
I guess I am not sure what is the clinic policy on mailing prescription?   I thought we cannot do it any more. I thought we did previously but it had changed a while ago) please verify.   Pt may have asked me but I don't think I would have confirmed without knowing the current clinic policy ( I thought we did previously but it had changed a while ago)

## 2019-03-22 ENCOUNTER — MYC REFILL (OUTPATIENT)
Dept: FAMILY MEDICINE | Facility: CLINIC | Age: 24
End: 2019-03-22

## 2019-03-22 DIAGNOSIS — F90.0 ATTENTION DEFICIT HYPERACTIVITY DISORDER (ADHD), PREDOMINANTLY INATTENTIVE TYPE: ICD-10-CM

## 2019-03-25 NOTE — TELEPHONE ENCOUNTER
amphetamine-dextroamphetamine (ADDERALL XR) 20 MG 24 hr capsule    Last Written Prescription Date:  2/14/19  Last Fill Quantity: 30,  # refills: 0   Last office visit: 1/14/2019 with prescribing provider:  yes   Future Office Visit:      Requested Prescriptions   Pending Prescriptions Disp Refills     amphetamine-dextroamphetamine (ADDERALL XR) 20 MG 24 hr capsule 30 capsule 0     Sig: Take 1 capsule (20 mg) by mouth daily    There is no refill protocol information for this order     amphetamine-dextroamphetamine (ADDERALL) 10 MG tablet    Last Written Prescription Date:  2/14/19  Last Fill Quantity: 30,  # refills: 0   Last office visit: 1/14/2019 with prescribing provider:  yes   Future Office Visit:         amphetamine-dextroamphetamine (ADDERALL) 10 MG tablet 30 tablet 0     Sig: Take 1 tablet (10 mg) by mouth daily    There is no refill protocol information for this order        Routing refill request to provider for review/approval because:  Drug not on the [facility] refill protocol     RX monitoring program ([place]) reviewed:  not reviewed/not due - last done on 2/11/19    [place] profile:  [email or web address]      Charlotte CASPER, RN   [facility]

## 2019-03-26 RX ORDER — DEXTROAMPHETAMINE SACCHARATE, AMPHETAMINE ASPARTATE, DEXTROAMPHETAMINE SULFATE AND AMPHETAMINE SULFATE 2.5; 2.5; 2.5; 2.5 MG/1; MG/1; MG/1; MG/1
10 TABLET ORAL DAILY
Qty: 30 TABLET | Refills: 0 | Status: SHIPPED | OUTPATIENT
Start: 2019-03-26 | End: 2019-04-24

## 2019-03-26 RX ORDER — DEXTROAMPHETAMINE SACCHARATE, AMPHETAMINE ASPARTATE MONOHYDRATE, DEXTROAMPHETAMINE SULFATE AND AMPHETAMINE SULFATE 5; 5; 5; 5 MG/1; MG/1; MG/1; MG/1
20 CAPSULE, EXTENDED RELEASE ORAL DAILY
Qty: 30 CAPSULE | Refills: 0 | Status: SHIPPED | OUTPATIENT
Start: 2019-03-26 | End: 2019-04-24

## 2019-03-27 NOTE — TELEPHONE ENCOUNTER
RX (2) for Adderall brought to the  for patient to , [employer] message sent to patient.      .Louise FONTENOT    [facility] [place]

## 2019-04-24 ENCOUNTER — MYC REFILL (OUTPATIENT)
Dept: FAMILY MEDICINE | Facility: CLINIC | Age: 24
End: 2019-04-24

## 2019-04-24 DIAGNOSIS — F90.0 ATTENTION DEFICIT HYPERACTIVITY DISORDER (ADHD), PREDOMINANTLY INATTENTIVE TYPE: ICD-10-CM

## 2019-04-24 DIAGNOSIS — F41.8 ANXIOUS DEPRESSION: ICD-10-CM

## 2019-04-24 RX ORDER — SERTRALINE HYDROCHLORIDE 25 MG/1
25 TABLET, FILM COATED ORAL DAILY
Qty: 90 TABLET | Refills: 1 | Status: CANCELLED | OUTPATIENT
Start: 2019-04-24

## 2019-04-24 RX ORDER — BUPROPION HYDROCHLORIDE 150 MG/1
150 TABLET ORAL EVERY MORNING
Qty: 90 TABLET | Refills: 1 | Status: CANCELLED | OUTPATIENT
Start: 2019-04-24

## 2019-04-24 NOTE — TELEPHONE ENCOUNTER
Controlled Substance Refill Request for adderall 10 mg and adderall XR 20 mg  Problem List Complete:  No     PROVIDER TO CONSIDER COMPLETION OF PROBLEM LIST AND OVERVIEW/CONTROLLED SUBSTANCE AGREEMENT    Last Written Prescription Date:  See below      THE MOST RECENT OFFICE VISIT MUST BE WITHIN THE PAST 3 MONTHS. AT LEAST ONE FACE TO FACE VISIT MUST OCCUR EVERY 6 MONTHS. ADDITIONAL VISITS CAN BE VIRTUAL.  (THIS STATEMENT SHOULD BE DELETED.)    [place] [facility] [place] [facility] [place]: 1/14/19    Future Office visit:     Controlled substance agreement:   Encounter-Level CSA - 03/06/2018:    Controlled Substance Agreement - Scan on 3/12/2018 10:26 AM: CONTROLLED SUBSTANCE AGREEMENT (below)       Patient-Level CSA:    There are no patient-level csa.         Last Urine Drug Screen: No results found for: CDAUT, No results found for: COMDAT, No results found for: THC13, PCP13, COC13, MAMP13, OPI13, AMP13, BZO13, TCA13, MTD13, BAR13, OXY13, PPX13, BUP13     Processing:  Staff will hand deliver Rx to on-site pharmacy     [email or web address]       checked in past 6 months?  Yes 2/11/19   Maddison Ryees RN

## 2019-04-24 NOTE — TELEPHONE ENCOUNTER
"Should have refill on bupropion and zoloft- adderall sent to provider.    Whit JacksonRN BSN  [facility]  [phone]      Requested Prescriptions   Pending Prescriptions Disp Refills     buPROPion (WELLBUTRIN XL) 150 MG 24 hr tablet 90 tablet 1     Sig: Take 1 tablet (150 mg) by mouth every morning  Last Written Prescription Date:  1/14/19  Last Fill Quantity: 90,  # refills: 1   Last office visit: 1/14/2019 with prescribing provider:     Future Office Visit:           SSRIs Protocol Passed - 4/24/2019  8:14 AM        Passed - Recent (12 mo) or future (30 days) visit within the authorizing provider's specialty     Patient had office visit in the last 12 months or has a visit in the next 30 days with authorizing provider or within the authorizing provider's specialty.  See \"Patient Info\" tab in inbasket, or \"Choose Columns\" in Meds & Orders section of the refill encounter.              Passed - Medication is Bupropio         If the medication is Bupropion (Wellbutrin), and the patient is taking for smoking cessation; OK to refill.          Passed - Medication is active on med list        Passed - Patient is age 18 or older        sertraline (ZOLOFT) 25 MG tablet 90 tablet 1     Sig: Take 1 tablet (25 mg) by mouth daily  Last Written Prescription Date:  01/14/19  Last Fill Quantity: 90,  # refills: 1   Last office visit: 1/14/2019 with prescribing provider:     Future Office Visit:           SSRIs Protocol Passed - 4/24/2019  8:14 AM        Passed - Recent (12 mo) or future (30 days) visit within the authorizing provider's specialty     Patient had office visit in the last 12 months or has a visit in the next 30 days with authorizing provider or within the authorizing provider's specialty.  See \"Patient Info\" tab in inbasket, or \"Choose Columns\" in Meds & Orders section of the refill encounter.              Passed - Medication is Bupropion     If the medication is Bupropion (Wellbutrin), and the " patient is taking for smoking cessation; OK to refill.          Passed - Medication is active on med list        Passed - Patient is age 18 or older        amphetamine-dextroamphetamine (ADDERALL XR) 20 MG 24 hr capsule  Last Written Prescription Date:  03/26/19  Last Fill Quantity: 30,  # refills: 0   Last office visit: 1/14/2019 with prescribing provider:     Future Office Visit:     30 capsule 0     Sig: Take 1 capsule (20 mg) by mouth daily       There is no refill protocol information for this order        amphetamine-dextroamphetamine (ADDERALL) 10 MG tablet  Last Written Prescription Date:  03/26/19  Last Fill Quantity: 30,  # refills: 0   Last office visit: 1/14/2019 with prescribing provider:     Future Office Visit:     30 tablet 0     Sig: Take 1 tablet (10 mg) by mouth daily       There is no refill protocol information for this order

## 2019-04-25 RX ORDER — DEXTROAMPHETAMINE SACCHARATE, AMPHETAMINE ASPARTATE, DEXTROAMPHETAMINE SULFATE AND AMPHETAMINE SULFATE 2.5; 2.5; 2.5; 2.5 MG/1; MG/1; MG/1; MG/1
10 TABLET ORAL DAILY
Qty: 30 TABLET | Refills: 0 | Status: SHIPPED | OUTPATIENT
Start: 2019-04-25

## 2019-04-25 RX ORDER — DEXTROAMPHETAMINE SACCHARATE, AMPHETAMINE ASPARTATE MONOHYDRATE, DEXTROAMPHETAMINE SULFATE AND AMPHETAMINE SULFATE 5; 5; 5; 5 MG/1; MG/1; MG/1; MG/1
20 CAPSULE, EXTENDED RELEASE ORAL DAILY
Qty: 30 CAPSULE | Refills: 0 | Status: SHIPPED | OUTPATIENT
Start: 2019-04-25

## 2019-04-25 NOTE — TELEPHONE ENCOUNTER
Ok for one  refill   . Remind pt to do follow up for med check before next refill  since he is due.

## 2019-04-26 NOTE — TELEPHONE ENCOUNTER
RX for Adderall signed by Dr. Davis, brought to the  EP pharmacy per patient request. PT advised via [employer] message, TC also notified pt he is due to be seen prior to further refills.      .Louise FONTENOT    [facility]

## 2019-04-29 ENCOUNTER — OFFICE VISIT (OUTPATIENT)
Dept: FAMILY MEDICINE | Facility: CLINIC | Age: 24
End: 2019-04-29

## 2019-04-29 VITALS
WEIGHT: 315 LBS | DIASTOLIC BLOOD PRESSURE: 88 MMHG | TEMPERATURE: 97.8 F | OXYGEN SATURATION: 100 % | HEIGHT: 75 IN | BODY MASS INDEX: 39.17 KG/M2 | HEART RATE: 97 BPM | SYSTOLIC BLOOD PRESSURE: 120 MMHG | RESPIRATION RATE: 16 BRPM

## 2019-04-29 DIAGNOSIS — L23.7 CONTACT DERMATITIS DUE TO POISON IVY: Primary | ICD-10-CM

## 2019-04-29 PROCEDURE — 99213 OFFICE O/P EST LOW 20 MIN: CPT | Performed by: PHYSICIAN ASSISTANT

## 2019-04-29 RX ORDER — PREDNISONE 20 MG/1
TABLET ORAL
Qty: 14 TABLET | Refills: 0 | Status: SHIPPED | OUTPATIENT
Start: 2019-04-29

## 2019-04-29 ASSESSMENT — MIFFLIN-ST. JEOR: SCORE: 2544.36

## 2019-04-29 NOTE — PROGRESS NOTES
SUBJECTIVE:   Barrett Olivera is a 24 year old male who presents to clinic today for the following   health issues:      Rash      Duration: 1 week ago    Description  Location: right wrist  Itching: severe    Intensity:  severe    Accompanying signs and symptoms: None    History (similar episodes/previous evaluation): None    Precipitating or alleviating factors:  New exposures:  None and poison ivy  Recent travel: no      Therapies tried and outcome: creams/lotions OTC    Barrett has been experiencing a pruritic, vesicular rash on the skin of his right forearm x 1 week. This began after foraging for mushrooms at his grandmothers home.  He has had poison ivy after exposure to the woods at her home almost every year and tells me that these symptoms are the same as previous.  He has noticed a few new vesicles on his left forearm and back over the past few days.  He has been using calamine lotion and keeping the area wrapped.     Additional history: as documented    Reviewed  and updated as needed this visit by clinical staff         Reviewed and updated as needed this visit by Provider         Patient Active Problem List   Diagnosis     Attention deficit hyperactivity disorder (ADHD), predominantly inattentive type     Anxious depression     Elevated blood pressure reading without diagnosis of hypertension     BMI 40.0-44.9, adult (H)     Obesity, morbid, BMI 40.0-49.9 (H)     Panic attack     Controlled substance agreement signed     Pain in joint of left shoulder     Shoulder instability, left     MDD (major depressive disorder), recurrent episode, moderate (H)     REBECCA (generalized anxiety disorder)     Past Surgical History:   Procedure Laterality Date     KNEE SURGERY      X2 ON RT, ONE ON LEFT, INJURY RELATED ,        Social History     Tobacco Use     Smoking status: Never Smoker     Smokeless tobacco: Never Used   Substance Use Topics     Alcohol use: Yes     Family History   Problem Relation Age of Onset      "Breast Cancer Mother      Breast Cancer Maternal Grandmother      Thyroid Disease Other         paternal aunt low on thyroid      Diabetes No family hx of      Coronary Artery Disease No family hx of      Hypertension No family hx of      Hyperlipidemia No family hx of      Prostate Cancer No family hx of      Colon Cancer No family hx of          Current Outpatient Medications   Medication Sig Dispense Refill     amphetamine-dextroamphetamine (ADDERALL XR) 20 MG 24 hr capsule Take 1 capsule (20 mg) by mouth daily 30 capsule 0     amphetamine-dextroamphetamine (ADDERALL) 10 MG tablet Take 1 tablet (10 mg) by mouth daily 30 tablet 0     buPROPion (WELLBUTRIN XL) 150 MG 24 hr tablet Take 1 tablet (150 mg) by mouth every morning 90 tablet 1     cetirizine (ZYRTEC) 10 MG tablet Take 10 mg by mouth daily       predniSONE (DELTASONE) 20 MG tablet Take 2 tabs (40 mg) orally daily for 4 days, then Take 1 tab (20 mg) orally daily for 4 days, then Take 1/2 tab (10 mg) orally for 4 days. 14 tablet 0     sertraline (ZOLOFT) 25 MG tablet Take 1 tablet (25 mg) by mouth daily 90 tablet 1     Allergies   Allergen Reactions     Amoxicillin-Pot Clavulanate Anaphylaxis     Shellfish Allergy Swelling     Adhesive Tape Rash     Augmentin Rash       ROS:  Constitutional, HEENT, cardiovascular, pulmonary, gi and gu systems are negative, except as otherwise noted.    OBJECTIVE:     /88   Pulse 97   Temp 97.8  F (36.6  C) (Tympanic)   Resp 16   Ht 1.905 m (6' 3\")   Wt 147.9 kg (326 lb)   SpO2 100%   BMI 40.75 kg/m    Body mass index is 40.75 kg/m .  GENERAL: healthy, alert and no distress  SKIN: right forarm with 5x5 area of clustered erythematous weeping vesicles with some overlying crust, smaller 3x3 area of similar appearance just along the middle of his right form.  Small scattered intact vesicles on left forearm and a few scattered vesicles on the skin of his left back.   Diagnostic Test Results:  none     ASSESSMENT/PLAN: "       1. Contact dermatitis due to poison ivy  Dermatitis is spreading to skin of left arm as well as back.  Will start steroid taper at this time.  Advised that he continue to keep the area covered to avoid scratching and he may continue calamine or may take benadryl for pruritis.  He will return if symptoms persist  - predniSONE (DELTASONE) 20 MG tablet; Take 2 tabs (40 mg) orally daily for 4 days, then Take 1 tab (20 mg) orally daily for 4 days, then Take 1/2 tab (10 mg) orally for 4 days.  Dispense: 14 tablet; Refill: 0    Follow-up: As above    Sukhi Chen PA-C  [facility] [place]
